# Patient Record
Sex: FEMALE | Race: BLACK OR AFRICAN AMERICAN | Employment: OTHER | URBAN - METROPOLITAN AREA
[De-identification: names, ages, dates, MRNs, and addresses within clinical notes are randomized per-mention and may not be internally consistent; named-entity substitution may affect disease eponyms.]

---

## 2017-02-03 ENCOUNTER — HOSPITAL ENCOUNTER (OUTPATIENT)
Dept: GENERAL RADIOLOGY | Age: 67
Discharge: HOME | End: 2017-02-03
Attending: FAMILY MEDICINE | Admitting: FAMILY MEDICINE

## 2017-02-03 LAB
BACTERIA: NEGATIVE /HPF
BILIRUBIN URINE: NEGATIVE MG/DL
BLOOD, URINE: NEGATIVE
CAST TYPE: ABNORMAL /HPF
CLARITY: CLEAR
COLOR: YELLOW
CRYSTAL TYPE: ABNORMAL /HPF
EPITHELIAL CELLS, UA: 10 /HPF
GLUCOSE, URINE: 500 MG/DL
KETONES, URINE: NEGATIVE MG/DL
LEUKOCYTE ESTERASE, URINE: NEGATIVE
MUCUS: ABNORMAL HPF
NITRITE URINE, QUANTITATIVE: NEGATIVE
PH, URINE: 6 (ref 5–8)
PROTEIN UA: NEGATIVE MG/DL
RBC URINE: 1 /HPF (ref 0–6)
SPECIFIC GRAVITY UA: 1.01 (ref 1–1.03)
UROBILINOGEN, URINE: <2 EU/DL (ref 0.2–1)
VOLUME, (UVOL): 12 ML (ref 10–12)
WBC UA: 2 /HPF (ref 0–5)

## 2017-06-12 ENCOUNTER — HOSPITAL ENCOUNTER (OUTPATIENT)
Dept: GENERAL RADIOLOGY | Age: 67
Discharge: HOME | End: 2017-06-12
Attending: FAMILY MEDICINE | Admitting: FAMILY MEDICINE

## 2017-06-12 LAB
ALBUMIN SERPL-MCNC: 4.4 GM/DL (ref 3.4–5)
ALP BLD-CCNC: 111 IU/L (ref 40–128)
ALT SERPL-CCNC: 18 U/L (ref 10–40)
ANION GAP SERPL CALCULATED.3IONS-SCNC: 12 MMOL/L (ref 4–16)
AST SERPL-CCNC: 14 IU/L (ref 15–37)
BILIRUB SERPL-MCNC: 0.7 MG/DL (ref 0–1)
BUN BLDV-MCNC: 22 MG/DL (ref 6–23)
CALCIUM SERPL-MCNC: 10.3 MG/DL (ref 8.3–10.6)
CHLORIDE BLD-SCNC: 98 MMOL/L (ref 99–110)
CHOLESTEROL: 266 MG/DL
CO2: 27 MMOL/L (ref 21–32)
CREAT SERPL-MCNC: 0.9 MG/DL (ref 0.6–1.1)
CREATININE URINE: 167.2 MG/DL (ref 28–217)
ERYTHROCYTE SEDIMENTATION RATE: 26 MM/HR (ref 0–30)
ESTIMATED AVERAGE GLUCOSE: 303 MG/DL
GFR AFRICAN AMERICAN: >60 ML/MIN/1.73M2
GFR NON-AFRICAN AMERICAN: >60 ML/MIN/1.73M2
GLUCOSE BLD-MCNC: 273 MG/DL (ref 70–140)
HBA1C MFR BLD: 12.2 % (ref 4.2–6.3)
HCT VFR BLD CALC: 35 % (ref 37–47)
HDLC SERPL-MCNC: 62 MG/DL
HEMOGLOBIN: 12.4 GM/DL (ref 12.5–16)
LDL CHOLESTEROL CALCULATED: 173 MG/DL
MCH RBC QN AUTO: 29 PG (ref 27–31)
MCHC RBC AUTO-ENTMCNC: 35.4 % (ref 32–36)
MCV RBC AUTO: 81.8 FL (ref 78–100)
MICROALBUMIN/CREAT 24H UR: 5.3 MG/DL
MICROALBUMIN/CREAT UR-RTO: 31.7 MG/G CREAT (ref 0–30)
PDW BLD-RTO: 13.2 % (ref 11.7–14.9)
PLATELET # BLD: 237 K/CU MM (ref 140–440)
PMV BLD AUTO: 10.4 FL (ref 7.5–11.1)
POTASSIUM SERPL-SCNC: 4.8 MMOL/L (ref 3.5–5.1)
RBC # BLD: 4.28 M/CU MM (ref 4.2–5.4)
SODIUM BLD-SCNC: 137 MMOL/L (ref 135–145)
T4 FREE: 1.13 NG/DL (ref 0.9–1.8)
TOTAL PROTEIN: 7.2 GM/DL (ref 6.4–8.2)
TRIGL SERPL-MCNC: 156 MG/DL
TSH HIGH SENSITIVITY: 1.72 UIU/ML (ref 0.27–4.2)
WBC # BLD: 4.2 K/CU MM (ref 4–10.5)

## 2018-02-07 ENCOUNTER — HOSPITAL ENCOUNTER (OUTPATIENT)
Dept: WOMENS IMAGING | Age: 68
Discharge: HOME | End: 2018-02-07
Attending: FAMILY MEDICINE | Admitting: FAMILY MEDICINE

## 2018-02-07 DIAGNOSIS — Z12.31 SCREENING MAMMOGRAM, ENCOUNTER FOR: ICD-10-CM

## 2018-02-13 ENCOUNTER — HOSPITAL ENCOUNTER (OUTPATIENT)
Dept: GENERAL RADIOLOGY | Age: 68
Discharge: HOME | End: 2018-02-13
Attending: FAMILY MEDICINE | Admitting: FAMILY MEDICINE

## 2018-02-13 DIAGNOSIS — N95.9 MENOPAUSAL AND PERIMENOPAUSAL DISORDER: ICD-10-CM

## 2018-02-13 LAB
ALBUMIN SERPL-MCNC: 4.5 GM/DL (ref 3.4–5)
ALP BLD-CCNC: 121 IU/L (ref 40–128)
ALT SERPL-CCNC: 14 U/L (ref 10–40)
ANION GAP SERPL CALCULATED.3IONS-SCNC: 11 MMOL/L (ref 4–16)
AST SERPL-CCNC: 15 IU/L (ref 15–37)
BASOPHILS ABSOLUTE: 0 K/CU MM
BASOPHILS RELATIVE PERCENT: 0.4 % (ref 0–1)
BILIRUB SERPL-MCNC: 0.5 MG/DL (ref 0–1)
BUN BLDV-MCNC: 22 MG/DL (ref 6–23)
CALCIUM SERPL-MCNC: 10.3 MG/DL (ref 8.3–10.6)
CHLORIDE BLD-SCNC: 98 MMOL/L (ref 99–110)
CHOLESTEROL: 313 MG/DL
CO2: 28 MMOL/L (ref 21–32)
CREAT SERPL-MCNC: 0.9 MG/DL (ref 0.6–1.1)
CREATININE URINE: 131.6 MG/DL (ref 28–217)
DIFFERENTIAL TYPE: ABNORMAL
EOSINOPHILS ABSOLUTE: 0 K/CU MM
EOSINOPHILS RELATIVE PERCENT: 0.8 % (ref 0–3)
ERYTHROCYTE SEDIMENTATION RATE: 29 MM/HR (ref 0–30)
ESTIMATED AVERAGE GLUCOSE: 344 MG/DL
GFR AFRICAN AMERICAN: >60 ML/MIN/1.73M2
GFR NON-AFRICAN AMERICAN: >60 ML/MIN/1.73M2
GLUCOSE FASTING: 299 MG/DL (ref 70–99)
HBA1C MFR BLD: 13.6 % (ref 4.2–6.3)
HCT VFR BLD CALC: 36.1 % (ref 37–47)
HDLC SERPL-MCNC: 66 MG/DL
HEMOGLOBIN: 12.5 GM/DL (ref 12.5–16)
IMMATURE NEUTROPHIL %: 0 % (ref 0–0.43)
LDL CHOLESTEROL DIRECT: 210 MG/DL
LYMPHOCYTES ABSOLUTE: 2.9 K/CU MM
LYMPHOCYTES RELATIVE PERCENT: 60.2 % (ref 24–44)
MAGNESIUM: 2.1 MG/DL (ref 1.8–2.4)
MCH RBC QN AUTO: 28.2 PG (ref 27–31)
MCHC RBC AUTO-ENTMCNC: 34.6 % (ref 32–36)
MCV RBC AUTO: 81.3 FL (ref 78–100)
MICROALBUMIN/CREAT 24H UR: 14.3 MG/DL
MICROALBUMIN/CREAT UR-RTO: 108.7 MG/G CREAT (ref 0–30)
MONOCYTES ABSOLUTE: 0.2 K/CU MM
MONOCYTES RELATIVE PERCENT: 4.7 % (ref 0–4)
NUCLEATED RBC %: 0 %
PDW BLD-RTO: 13.1 % (ref 11.7–14.9)
PLATELET # BLD: 241 K/CU MM (ref 140–440)
PMV BLD AUTO: 10.4 FL (ref 7.5–11.1)
POTASSIUM SERPL-SCNC: 4.7 MMOL/L (ref 3.5–5.1)
RBC # BLD: 4.44 M/CU MM (ref 4.2–5.4)
SEGMENTED NEUTROPHILS ABSOLUTE COUNT: 1.6 K/CU MM
SEGMENTED NEUTROPHILS RELATIVE PERCENT: 33.9 % (ref 36–66)
SODIUM BLD-SCNC: 137 MMOL/L (ref 135–145)
T4 FREE: 1.12 NG/DL (ref 0.9–1.8)
TOTAL IMMATURE NEUTOROPHIL: 0 K/CU MM
TOTAL NUCLEATED RBC: 0 K/CU MM
TOTAL PROTEIN: 7.6 GM/DL (ref 6.4–8.2)
TRIGL SERPL-MCNC: 203 MG/DL
TSH HIGH SENSITIVITY: 1.01 UIU/ML (ref 0.27–4.2)
URIC ACID: 4.5 MG/DL (ref 2.6–6)
VITAMIN D 25-HYDROXY: 16.41 NG/ML
WBC # BLD: 4.9 K/CU MM (ref 4–10.5)

## 2018-02-14 ENCOUNTER — HOSPITAL ENCOUNTER (OUTPATIENT)
Dept: OTHER | Age: 68
Discharge: HOME | End: 2018-02-14
Attending: OBSTETRICS & GYNECOLOGY | Admitting: OBSTETRICS & GYNECOLOGY

## 2018-02-20 LAB
HPV SOURCE: NORMAL
HPV, HIGH RISK OTHER: NEGATIVE

## 2018-05-26 PROBLEM — S72.001A CLOSED RIGHT HIP FRACTURE, INITIAL ENCOUNTER (HCC): Status: ACTIVE | Noted: 2018-05-26

## 2018-05-31 PROBLEM — D62 ACUTE BLOOD LOSS ANEMIA: Status: ACTIVE | Noted: 2018-05-31

## 2018-05-31 PROBLEM — I10 ESSENTIAL HYPERTENSION: Status: ACTIVE | Noted: 2018-05-31

## 2018-05-31 PROBLEM — R26.9 GAIT DISTURBANCE: Status: ACTIVE | Noted: 2018-05-31

## 2018-05-31 PROBLEM — R52 UNCONTROLLED PAIN: Status: ACTIVE | Noted: 2018-05-31

## 2018-07-20 ENCOUNTER — HOSPITAL ENCOUNTER (OUTPATIENT)
Dept: PHYSICAL THERAPY | Age: 68
Discharge: HOME | End: 2018-07-31
Attending: ORTHOPAEDIC SURGERY | Admitting: ORTHOPAEDIC SURGERY

## 2018-07-20 ASSESSMENT — PAIN SCALES - GENERAL: PAINLEVEL_OUTOF10: 0

## 2018-07-20 ASSESSMENT — PAIN DESCRIPTION - PAIN TYPE: TYPE: CHRONIC PAIN

## 2018-07-20 ASSESSMENT — PAIN DESCRIPTION - DESCRIPTORS: DESCRIPTORS: ACHING;DULL

## 2018-07-20 ASSESSMENT — PAIN DESCRIPTION - PROGRESSION: CLINICAL_PROGRESSION: GRADUALLY IMPROVING

## 2018-07-20 ASSESSMENT — PAIN DESCRIPTION - DIRECTION: RADIATING_TOWARDS: LATERAL THIGH

## 2018-07-20 ASSESSMENT — PAIN DESCRIPTION - FREQUENCY: FREQUENCY: INTERMITTENT

## 2018-07-20 ASSESSMENT — PAIN DESCRIPTION - ORIENTATION: ORIENTATION: RIGHT

## 2018-07-20 ASSESSMENT — PAIN DESCRIPTION - ONSET: ONSET: PROGRESSIVE

## 2018-07-20 ASSESSMENT — PAIN DESCRIPTION - LOCATION: LOCATION: KNEE;LEG

## 2018-07-20 NOTE — PLAN OF CARE
Tot Dep.) [] CH (0% Impaired, Indep.)  [x] CI (1-19% Impaired, SBA-CGA)  [] CJ (20-39% Impaired, MIN A)  [] CK  (40-59% Impairment, Mod A)  [] CL  (60-79% Impairment, Max A)  [] CM  (80-99% Impairment, Dep.)   [] CN  (100% Impairment, Tot Dep.)  [] CH (0% Impaired, Indep.)  [] CI (1-19% Impaired, SBA-CGA)  [] CJ (20-39% Impaired, MIN A)  [] CK  (40-59% Impairment, Mod A)  [] CL  (60-79% Impairment, Max A)  [] CM  (80-99% Impairment, Dep.)   [] CN  (100% Impairment, Tot Dep.)          Electronically signed by:  Ajit Ochoa PT, MPT, ATC  7/20/2018, 4:10 PM    7/20/2018, 4:11 PM  If you have any questions or concerns, please don't hesitate to call.   Thank you for your referral.      Physician Signature:________________________________Date:_________ TIME: _____  By signing above, therapists plan is approved by physician

## 2018-07-20 NOTE — FLOWSHEET NOTE
LAQ RTB 10x TB Fig 8 on ankles                                                              Other Therapeutic Activities/Education: Patient received education on their current pathology and how their condition effects them with their functional activities. Patient understood discussion and questions were answered. Patient understands their activity limitations and understands rational for treatment progression. Home Exercise Program:  Issued, practiced and pt demo ability to perform     Modality/intervention used:    [x] Therapeutic Exercise  [x] Modalities:  [x] Therapeutic Activity     [] Ultrasound  [] Elec  Stim  [x] Gait Training      [] Cervical Traction [] Lumbar Traction  [x] Neuromuscular Re-education    [x] Cold/hotpack [] Iontophoresis   [x] Instruction in HEP      [] Vasopneumatic     [x] Manual Therapy               [x] Aquatic Therapy     Manual Treatments: none     Modalities:  none    Communication with other providers:  POC faxed 7/20/18    Education provided to patient/caregiver:  Pool information given and verbal instructions as well    Adverse reactions to treatment:      Equipment provided:      Assessment: Pt is a 80 yo female who presents almost 2 months postop R hip Fx and ORIF. She continues w/ limited hip and LE strength and has developed knee pain now as well. She demonstrates altered gait and decreased balance w/ difficulty getting up from lower surfaces as well. She will benefit from PT to help restore her prior functional level. Prior to her injury in May she had no pain or limit in R hip or knee. Patient agrees with established plan of care and assisted in the development of their short term and long term goals. Patient had no adverse reaction with initial treatment and there are no barriers to learning.  Demonstrates no mental or cognitive disorder.      Time In / Time Out:    1430/1530               Timed Code/Total Treatment Minutes:  25/60    Patients Report of Tolerance: [] Patient limited by fatigue        [x] Patient limited by pain   [] Patient limited by other medical complications   [] Other:     Prognosis:   [x] Good [] Fair  [] Poor    Plan:   [] Continue per plan of care [] Alter current plan (see comments)  [x] Plan of care initiated [] Hold pending MD visit [] Discharge    Plan for Next Session:   begin pool Rx 2x/week for 3 weeks, then re-check and determine if stay at pool or add any more land based Rx       Next Progress Note due:  By Visit 10          Electronically signed by:  Nathan Loja, PT, MPT, ATC  7/20/2018, 4:11 PM    7/20/2018, 4:15 PM

## 2018-08-01 ENCOUNTER — HOSPITAL ENCOUNTER (OUTPATIENT)
Dept: OTHER | Age: 68
Discharge: HOME | End: 2018-08-31
Attending: ORTHOPAEDIC SURGERY | Admitting: ORTHOPAEDIC SURGERY

## 2018-11-02 ENCOUNTER — HOSPITAL ENCOUNTER (OUTPATIENT)
Age: 68
Discharge: HOME | End: 2018-11-02
Payer: MEDICARE

## 2018-11-02 LAB
ALBUMIN SERPL-MCNC: 4.4 GM/DL (ref 3.4–5)
ALP BLD-CCNC: 148 IU/L (ref 40–128)
ALT SERPL-CCNC: 12 U/L (ref 10–40)
ANION GAP SERPL CALCULATED.3IONS-SCNC: 14 MMOL/L (ref 4–16)
AST SERPL-CCNC: 12 IU/L (ref 15–37)
BILIRUB SERPL-MCNC: 0.6 MG/DL (ref 0–1)
BUN BLDV-MCNC: 18 MG/DL (ref 6–23)
CALCIUM SERPL-MCNC: 10.1 MG/DL (ref 8.3–10.6)
CHLORIDE BLD-SCNC: 99 MMOL/L (ref 99–110)
CHOLESTEROL: 276 MG/DL
CO2: 24 MMOL/L (ref 21–32)
CREAT SERPL-MCNC: 0.8 MG/DL (ref 0.6–1.1)
CREATININE URINE: 191.2 MG/DL (ref 28–217)
ESTIMATED AVERAGE GLUCOSE: 240 MG/DL
GFR AFRICAN AMERICAN: >60 ML/MIN/1.73M2
GFR NON-AFRICAN AMERICAN: >60 ML/MIN/1.73M2
GLUCOSE BLD-MCNC: 301 MG/DL (ref 70–99)
HBA1C MFR BLD: 10 % (ref 4.2–6.3)
HCT VFR BLD CALC: 33.8 % (ref 37–47)
HDLC SERPL-MCNC: 57 MG/DL
HEMOGLOBIN: 11.5 GM/DL (ref 12.5–16)
LDL CHOLESTEROL DIRECT: 203 MG/DL
MCH RBC QN AUTO: 29.2 PG (ref 27–31)
MCHC RBC AUTO-ENTMCNC: 34 % (ref 32–36)
MCV RBC AUTO: 85.8 FL (ref 78–100)
MICROALBUMIN/CREAT 24H UR: 25.9 MG/DL
MICROALBUMIN/CREAT UR-RTO: 135.5 MG/G CREAT (ref 0–30)
PDW BLD-RTO: 14.1 % (ref 11.7–14.9)
PLATELET # BLD: 240 K/CU MM (ref 140–440)
PMV BLD AUTO: 10.6 FL (ref 7.5–11.1)
POTASSIUM SERPL-SCNC: 4.8 MMOL/L (ref 3.5–5.1)
RBC # BLD: 3.94 M/CU MM (ref 4.2–5.4)
SODIUM BLD-SCNC: 137 MMOL/L (ref 135–145)
T4 FREE: 1.11 NG/DL (ref 0.9–1.8)
TOTAL PROTEIN: 7 GM/DL (ref 6.4–8.2)
TRIGL SERPL-MCNC: 185 MG/DL
TSH HIGH SENSITIVITY: 1.38 UIU/ML (ref 0.27–4.2)
WBC # BLD: 5.5 K/CU MM (ref 4–10.5)

## 2018-11-02 PROCEDURE — 82570 ASSAY OF URINE CREATININE: CPT

## 2018-11-02 PROCEDURE — 82043 UR ALBUMIN QUANTITATIVE: CPT

## 2018-11-02 PROCEDURE — 80053 COMPREHEN METABOLIC PANEL: CPT

## 2018-11-02 PROCEDURE — 36415 COLL VENOUS BLD VENIPUNCTURE: CPT

## 2018-11-02 PROCEDURE — 83036 HEMOGLOBIN GLYCOSYLATED A1C: CPT

## 2018-11-02 PROCEDURE — 85027 COMPLETE CBC AUTOMATED: CPT

## 2018-11-02 PROCEDURE — 80061 LIPID PANEL: CPT

## 2018-11-02 PROCEDURE — 84443 ASSAY THYROID STIM HORMONE: CPT

## 2018-11-02 PROCEDURE — 83721 ASSAY OF BLOOD LIPOPROTEIN: CPT

## 2018-11-02 PROCEDURE — 84439 ASSAY OF FREE THYROXINE: CPT

## 2018-12-18 ENCOUNTER — HOSPITAL ENCOUNTER (OUTPATIENT)
Age: 68
Setting detail: SPECIMEN
Discharge: HOME | End: 2018-12-18
Payer: MEDICARE

## 2018-12-18 LAB
BACTERIA: ABNORMAL /HPF
BILIRUBIN URINE: NEGATIVE MG/DL
BLOOD, URINE: ABNORMAL
CLARITY: ABNORMAL
COLOR: ABNORMAL
GLUCOSE, URINE: NEGATIVE MG/DL
KETONES, URINE: NEGATIVE MG/DL
LEUKOCYTE ESTERASE, URINE: ABNORMAL
MUCUS: ABNORMAL HPF
NITRITE URINE, QUANTITATIVE: NEGATIVE
PH, URINE: 5 (ref 5–8)
PROTEIN UA: 100 MG/DL
RBC URINE: 197 /HPF (ref 0–6)
SPECIFIC GRAVITY UA: 1.02 (ref 1–1.03)
SQUAMOUS EPITHELIAL: 27 /HPF
TRICHOMONAS: ABNORMAL /HPF
UROBILINOGEN, URINE: 1 MG/DL (ref 0.2–1)
WBC CLUMP: ABNORMAL /HPF
WBC UA: 298 /HPF (ref 0–5)

## 2018-12-18 PROCEDURE — 87186 SC STD MICRODIL/AGAR DIL: CPT

## 2018-12-18 PROCEDURE — 87077 CULTURE AEROBIC IDENTIFY: CPT

## 2018-12-18 PROCEDURE — 81001 URINALYSIS AUTO W/SCOPE: CPT

## 2018-12-18 PROCEDURE — 87086 URINE CULTURE/COLONY COUNT: CPT

## 2018-12-20 LAB
CULTURE: NORMAL
Lab: NORMAL
ORGANISM: NORMAL
REPORT STATUS: NORMAL
SPECIMEN: NORMAL
TOTAL COLONY COUNT: NORMAL

## 2019-04-01 ENCOUNTER — HOSPITAL ENCOUNTER (OUTPATIENT)
Dept: WOMENS IMAGING | Age: 69
Discharge: HOME | End: 2019-04-01
Payer: MEDICARE

## 2019-04-01 ENCOUNTER — HOSPITAL ENCOUNTER (OUTPATIENT)
Age: 69
Discharge: HOME | End: 2019-04-01
Payer: MEDICARE

## 2019-04-01 DIAGNOSIS — Z12.31 VISIT FOR SCREENING MAMMOGRAM: ICD-10-CM

## 2019-04-01 LAB
ALBUMIN SERPL-MCNC: 4.5 GM/DL (ref 3.4–5)
ALP BLD-CCNC: 113 IU/L (ref 40–128)
ALT SERPL-CCNC: 12 U/L (ref 10–40)
ANION GAP SERPL CALCULATED.3IONS-SCNC: 11 MMOL/L (ref 4–16)
AST SERPL-CCNC: 13 IU/L (ref 15–37)
BASOPHILS ABSOLUTE: 0 K/CU MM
BASOPHILS RELATIVE PERCENT: 0.4 % (ref 0–1)
BILIRUB SERPL-MCNC: 0.5 MG/DL (ref 0–1)
BUN BLDV-MCNC: 23 MG/DL (ref 6–23)
CALCIUM SERPL-MCNC: 10.5 MG/DL (ref 8.3–10.6)
CHLORIDE BLD-SCNC: 105 MMOL/L (ref 99–110)
CHOLESTEROL: 206 MG/DL
CO2: 22 MMOL/L (ref 21–32)
CREAT SERPL-MCNC: 0.9 MG/DL (ref 0.6–1.1)
CREATININE URINE: 189.5 MG/DL (ref 28–217)
DIFFERENTIAL TYPE: ABNORMAL
EOSINOPHILS ABSOLUTE: 0 K/CU MM
EOSINOPHILS RELATIVE PERCENT: 0.6 % (ref 0–3)
ERYTHROCYTE SEDIMENTATION RATE: 31 MM/HR (ref 0–30)
ESTIMATED AVERAGE GLUCOSE: 243 MG/DL
GFR AFRICAN AMERICAN: >60 ML/MIN/1.73M2
GFR NON-AFRICAN AMERICAN: >60 ML/MIN/1.73M2
GLUCOSE BLD-MCNC: 181 MG/DL (ref 70–99)
HBA1C MFR BLD: 10.1 % (ref 4.2–6.3)
HCT VFR BLD CALC: 34.8 % (ref 37–47)
HDLC SERPL-MCNC: 58 MG/DL
HEMOGLOBIN: 11.7 GM/DL (ref 12.5–16)
IMMATURE NEUTROPHIL %: 0 % (ref 0–0.43)
LDL CHOLESTEROL DIRECT: 134 MG/DL
LYMPHOCYTES ABSOLUTE: 3 K/CU MM
LYMPHOCYTES RELATIVE PERCENT: 63.4 % (ref 24–44)
MCH RBC QN AUTO: 28.9 PG (ref 27–31)
MCHC RBC AUTO-ENTMCNC: 33.6 % (ref 32–36)
MCV RBC AUTO: 85.9 FL (ref 78–100)
MICROALBUMIN/CREAT 24H UR: 23.4 MG/DL
MICROALBUMIN/CREAT UR-RTO: 123.5 MG/G CREAT (ref 0–30)
MONOCYTES ABSOLUTE: 0.3 K/CU MM
MONOCYTES RELATIVE PERCENT: 6.8 % (ref 0–4)
NUCLEATED RBC %: 0 %
PDW BLD-RTO: 14.1 % (ref 11.7–14.9)
PLATELET # BLD: 257 K/CU MM (ref 140–440)
PMV BLD AUTO: 10.6 FL (ref 7.5–11.1)
POTASSIUM SERPL-SCNC: 5.6 MMOL/L (ref 3.5–5.1)
RBC # BLD: 4.05 M/CU MM (ref 4.2–5.4)
SEGMENTED NEUTROPHILS ABSOLUTE COUNT: 1.4 K/CU MM
SEGMENTED NEUTROPHILS RELATIVE PERCENT: 28.8 % (ref 36–66)
SODIUM BLD-SCNC: 138 MMOL/L (ref 135–145)
T4 FREE: 0.97 NG/DL (ref 0.9–1.8)
TOTAL IMMATURE NEUTOROPHIL: 0 K/CU MM
TOTAL NUCLEATED RBC: 0 K/CU MM
TOTAL PROTEIN: 7.1 GM/DL (ref 6.4–8.2)
TRIGL SERPL-MCNC: 151 MG/DL
TSH HIGH SENSITIVITY: 2.27 UIU/ML (ref 0.27–4.2)
URIC ACID: 5.5 MG/DL (ref 2.6–6)
WBC # BLD: 4.7 K/CU MM (ref 4–10.5)

## 2019-04-01 PROCEDURE — 85025 COMPLETE CBC W/AUTO DIFF WBC: CPT

## 2019-04-01 PROCEDURE — 83036 HEMOGLOBIN GLYCOSYLATED A1C: CPT

## 2019-04-01 PROCEDURE — 83721 ASSAY OF BLOOD LIPOPROTEIN: CPT

## 2019-04-01 PROCEDURE — 84439 ASSAY OF FREE THYROXINE: CPT

## 2019-04-01 PROCEDURE — 84443 ASSAY THYROID STIM HORMONE: CPT

## 2019-04-01 PROCEDURE — 36415 COLL VENOUS BLD VENIPUNCTURE: CPT

## 2019-04-01 PROCEDURE — 85652 RBC SED RATE AUTOMATED: CPT

## 2019-04-01 PROCEDURE — 82570 ASSAY OF URINE CREATININE: CPT

## 2019-04-01 PROCEDURE — 80053 COMPREHEN METABOLIC PANEL: CPT

## 2019-04-01 PROCEDURE — 82043 UR ALBUMIN QUANTITATIVE: CPT

## 2019-04-01 PROCEDURE — 84550 ASSAY OF BLOOD/URIC ACID: CPT

## 2019-04-01 PROCEDURE — 80061 LIPID PANEL: CPT

## 2019-04-01 PROCEDURE — 77067 SCR MAMMO BI INCL CAD: CPT

## 2019-04-03 ENCOUNTER — HOSPITAL ENCOUNTER (OUTPATIENT)
Dept: WOMENS IMAGING | Age: 69
Discharge: HOME | End: 2019-04-03
Payer: MEDICARE

## 2019-04-03 ENCOUNTER — HOSPITAL ENCOUNTER (OUTPATIENT)
Dept: ULTRASOUND IMAGING | Age: 69
End: 2019-04-03
Payer: MEDICARE

## 2019-04-03 DIAGNOSIS — N64.89 BREAST ASYMMETRY ON EXAMINATION: ICD-10-CM

## 2019-04-03 PROCEDURE — 77065 DX MAMMO INCL CAD UNI: CPT

## 2019-05-13 ENCOUNTER — HOSPITAL ENCOUNTER (OUTPATIENT)
Age: 69
Discharge: HOME | End: 2019-05-13
Payer: MEDICARE

## 2019-05-13 LAB
FERRITIN: 313 NG/ML (ref 15–150)
HCT VFR BLD CALC: 34.7 % (ref 37–47)
HEMOGLOBIN: 11.7 GM/DL (ref 12.5–16)
IRON: 113 UG/DL (ref 37–145)
PCT TRANSFERRIN: 43 % (ref 10–44)
POTASSIUM SERPL-SCNC: 5 MMOL/L (ref 3.5–5.1)
TOTAL IRON BINDING CAPACITY: 262 UG/DL (ref 250–450)
UNSATURATED IRON BINDING CAPACITY: 149 UG/DL (ref 110–370)

## 2019-05-13 PROCEDURE — 83550 IRON BINDING TEST: CPT

## 2019-05-13 PROCEDURE — 83540 ASSAY OF IRON: CPT

## 2019-05-13 PROCEDURE — 85014 HEMATOCRIT: CPT

## 2019-05-13 PROCEDURE — 84132 ASSAY OF SERUM POTASSIUM: CPT

## 2019-05-13 PROCEDURE — 85018 HEMOGLOBIN: CPT

## 2019-05-13 PROCEDURE — 36415 COLL VENOUS BLD VENIPUNCTURE: CPT

## 2019-05-13 PROCEDURE — 82728 ASSAY OF FERRITIN: CPT

## 2019-10-31 ENCOUNTER — HOSPITAL ENCOUNTER (OUTPATIENT)
Dept: WOMENS IMAGING | Age: 69
Discharge: HOME | End: 2019-10-31
Payer: MEDICARE

## 2019-10-31 ENCOUNTER — HOSPITAL ENCOUNTER (OUTPATIENT)
Age: 69
Discharge: HOME | End: 2019-10-31
Payer: MEDICARE

## 2019-10-31 DIAGNOSIS — Z12.31 SCREENING MAMMOGRAM, ENCOUNTER FOR: ICD-10-CM

## 2019-10-31 LAB
ALBUMIN SERPL-MCNC: 4.1 GM/DL (ref 3.4–5)
ALP BLD-CCNC: 108 IU/L (ref 40–129)
ALT SERPL-CCNC: 16 U/L (ref 10–40)
ANION GAP SERPL CALCULATED.3IONS-SCNC: 11 MMOL/L (ref 4–16)
AST SERPL-CCNC: 13 IU/L (ref 15–37)
BACTERIA: ABNORMAL /HPF
BASOPHILS ABSOLUTE: 0 K/CU MM
BASOPHILS RELATIVE PERCENT: 0.4 % (ref 0–1)
BILIRUB SERPL-MCNC: 0.5 MG/DL (ref 0–1)
BILIRUBIN DIRECT: 0.2 MG/DL (ref 0–0.3)
BILIRUBIN URINE: NEGATIVE MG/DL
BILIRUBIN, INDIRECT: 0.3 MG/DL (ref 0–0.7)
BLOOD, URINE: ABNORMAL
BUN BLDV-MCNC: 21 MG/DL (ref 6–23)
CALCIUM SERPL-MCNC: 10.1 MG/DL (ref 8.3–10.6)
CHLORIDE BLD-SCNC: 103 MMOL/L (ref 99–110)
CHOLESTEROL: 290 MG/DL
CLARITY: ABNORMAL
CO2: 24 MMOL/L (ref 21–32)
COLOR: YELLOW
CREAT SERPL-MCNC: 0.9 MG/DL (ref 0.6–1.1)
DIFFERENTIAL TYPE: ABNORMAL
EOSINOPHILS ABSOLUTE: 0.1 K/CU MM
EOSINOPHILS RELATIVE PERCENT: 1.3 % (ref 0–3)
ERYTHROCYTE SEDIMENTATION RATE: 34 MM/HR (ref 0–30)
ESTIMATED AVERAGE GLUCOSE: 324 MG/DL
GFR AFRICAN AMERICAN: >60 ML/MIN/1.73M2
GFR NON-AFRICAN AMERICAN: >60 ML/MIN/1.73M2
GLUCOSE BLD-MCNC: 303 MG/DL (ref 70–99)
GLUCOSE, URINE: >500 MG/DL
HBA1C MFR BLD: 12.9 % (ref 4.2–6.3)
HCT VFR BLD CALC: 32.2 % (ref 37–47)
HDLC SERPL-MCNC: 53 MG/DL
HEMOGLOBIN: 11 GM/DL (ref 12.5–16)
IMMATURE NEUTROPHIL %: 0.2 % (ref 0–0.43)
KETONES, URINE: NEGATIVE MG/DL
LDL CHOLESTEROL DIRECT: 194 MG/DL
LEUKOCYTE ESTERASE, URINE: ABNORMAL
LYMPHOCYTES ABSOLUTE: 3 K/CU MM
LYMPHOCYTES RELATIVE PERCENT: 56.3 % (ref 24–44)
MAGNESIUM: 2 MG/DL (ref 1.8–2.4)
MCH RBC QN AUTO: 29.2 PG (ref 27–31)
MCHC RBC AUTO-ENTMCNC: 34.2 % (ref 32–36)
MCV RBC AUTO: 85.4 FL (ref 78–100)
MONOCYTES ABSOLUTE: 0.3 K/CU MM
MONOCYTES RELATIVE PERCENT: 6.1 % (ref 0–4)
MUCUS: ABNORMAL HPF
NITRITE URINE, QUANTITATIVE: NEGATIVE
NUCLEATED RBC %: 0 %
PDW BLD-RTO: 13.3 % (ref 11.7–14.9)
PH, URINE: 5 (ref 5–8)
PLATELET # BLD: 208 K/CU MM (ref 140–440)
PMV BLD AUTO: 10.8 FL (ref 7.5–11.1)
POTASSIUM SERPL-SCNC: 5.1 MMOL/L (ref 3.5–5.1)
PROTEIN UA: 100 MG/DL
RBC # BLD: 3.77 M/CU MM (ref 4.2–5.4)
RBC URINE: 62 /HPF (ref 0–6)
SEGMENTED NEUTROPHILS ABSOLUTE COUNT: 1.9 K/CU MM
SEGMENTED NEUTROPHILS RELATIVE PERCENT: 35.7 % (ref 36–66)
SODIUM BLD-SCNC: 138 MMOL/L (ref 135–145)
SPECIFIC GRAVITY UA: 1.02 (ref 1–1.03)
SQUAMOUS EPITHELIAL: 8 /HPF
T4 FREE: 1.05 NG/DL (ref 0.9–1.8)
TOTAL IMMATURE NEUTOROPHIL: 0.01 K/CU MM
TOTAL NUCLEATED RBC: 0 K/CU MM
TOTAL PROTEIN: 6.6 GM/DL (ref 6.4–8.2)
TRICHOMONAS: ABNORMAL /HPF
TRIGL SERPL-MCNC: 248 MG/DL
TSH HIGH SENSITIVITY: 2.66 UIU/ML (ref 0.27–4.2)
URIC ACID: 5.1 MG/DL (ref 2.6–6)
UROBILINOGEN, URINE: 1 MG/DL (ref 0.2–1)
VITAMIN D 25-HYDROXY: 20.79 NG/ML
WBC # BLD: 5.3 K/CU MM (ref 4–10.5)
WBC UA: 17 /HPF (ref 0–5)

## 2019-10-31 PROCEDURE — 85025 COMPLETE CBC W/AUTO DIFF WBC: CPT

## 2019-10-31 PROCEDURE — 83735 ASSAY OF MAGNESIUM: CPT

## 2019-10-31 PROCEDURE — 85652 RBC SED RATE AUTOMATED: CPT

## 2019-10-31 PROCEDURE — 84550 ASSAY OF BLOOD/URIC ACID: CPT

## 2019-10-31 PROCEDURE — 83721 ASSAY OF BLOOD LIPOPROTEIN: CPT

## 2019-10-31 PROCEDURE — 84439 ASSAY OF FREE THYROXINE: CPT

## 2019-10-31 PROCEDURE — 80061 LIPID PANEL: CPT

## 2019-10-31 PROCEDURE — 82306 VITAMIN D 25 HYDROXY: CPT

## 2019-10-31 PROCEDURE — 81001 URINALYSIS AUTO W/SCOPE: CPT

## 2019-10-31 PROCEDURE — 77080 DXA BONE DENSITY AXIAL: CPT

## 2019-10-31 PROCEDURE — 84443 ASSAY THYROID STIM HORMONE: CPT

## 2019-10-31 PROCEDURE — 80053 COMPREHEN METABOLIC PANEL: CPT

## 2019-10-31 PROCEDURE — 82248 BILIRUBIN DIRECT: CPT

## 2019-10-31 PROCEDURE — 36415 COLL VENOUS BLD VENIPUNCTURE: CPT

## 2019-10-31 PROCEDURE — 83036 HEMOGLOBIN GLYCOSYLATED A1C: CPT

## 2020-01-01 ENCOUNTER — HOSPITAL ENCOUNTER (OUTPATIENT)
Dept: GENERAL RADIOLOGY | Age: 70
Discharge: HOME | End: 2020-09-21
Payer: MEDICARE

## 2020-01-01 ENCOUNTER — HOSPITAL ENCOUNTER (OUTPATIENT)
Dept: WOMENS IMAGING | Age: 70
Discharge: HOME | End: 2020-10-30
Payer: MEDICARE

## 2020-01-01 ENCOUNTER — HOSPITAL ENCOUNTER (OUTPATIENT)
Age: 70
Discharge: HOME | End: 2020-07-30
Payer: MEDICARE

## 2020-01-01 ENCOUNTER — HOSPITAL ENCOUNTER (OUTPATIENT)
Age: 70
Discharge: HOME | End: 2020-09-21
Payer: MEDICARE

## 2020-01-01 ENCOUNTER — OFFICE VISIT (OUTPATIENT)
Dept: ORTHOPEDIC SURGERY | Age: 70
End: 2020-01-01
Payer: MEDICARE

## 2020-01-01 VITALS
OXYGEN SATURATION: 98 % | BODY MASS INDEX: 24.92 KG/M2 | WEIGHT: 132 LBS | HEART RATE: 76 BPM | RESPIRATION RATE: 15 BRPM | HEIGHT: 61 IN

## 2020-01-01 LAB
ALBUMIN SERPL-MCNC: 4.4 GM/DL (ref 3.4–5)
ALP BLD-CCNC: 122 IU/L (ref 40–129)
ALT SERPL-CCNC: 15 U/L (ref 10–40)
ANION GAP SERPL CALCULATED.3IONS-SCNC: 11 MMOL/L (ref 4–16)
AST SERPL-CCNC: 14 IU/L (ref 15–37)
BACTERIA: NEGATIVE /HPF
BASOPHILS ABSOLUTE: 0 K/CU MM
BASOPHILS RELATIVE PERCENT: 0.8 % (ref 0–1)
BILIRUB SERPL-MCNC: 0.5 MG/DL (ref 0–1)
BILIRUBIN DIRECT: 0.2 MG/DL (ref 0–0.3)
BILIRUBIN URINE: NEGATIVE MG/DL
BILIRUBIN, INDIRECT: 0.3 MG/DL (ref 0–0.7)
BLOOD, URINE: ABNORMAL
BUN BLDV-MCNC: 23 MG/DL (ref 6–23)
CALCIUM SERPL-MCNC: 10.3 MG/DL (ref 8.3–10.6)
CHLORIDE BLD-SCNC: 97 MMOL/L (ref 99–110)
CHOLESTEROL: 339 MG/DL
CLARITY: CLEAR
CO2: 27 MMOL/L (ref 21–32)
COLOR: ABNORMAL
CREAT SERPL-MCNC: 1.3 MG/DL (ref 0.6–1.1)
DIFFERENTIAL TYPE: ABNORMAL
EOSINOPHILS ABSOLUTE: 0.1 K/CU MM
EOSINOPHILS RELATIVE PERCENT: 1.3 % (ref 0–3)
ERYTHROCYTE SEDIMENTATION RATE: 36 MM/HR (ref 0–30)
ESTIMATED AVERAGE GLUCOSE: 387 MG/DL
GFR AFRICAN AMERICAN: 49 ML/MIN/1.73M2
GFR NON-AFRICAN AMERICAN: 41 ML/MIN/1.73M2
GLUCOSE BLD-MCNC: 360 MG/DL (ref 70–99)
GLUCOSE, URINE: >500 MG/DL
HBA1C MFR BLD: 15.1 % (ref 4.2–6.3)
HCT VFR BLD CALC: 34.1 % (ref 37–47)
HDLC SERPL-MCNC: 49 MG/DL
HEMOGLOBIN: 11.6 GM/DL (ref 12.5–16)
IMMATURE NEUTROPHIL %: 0 % (ref 0–0.43)
KETONES, URINE: NEGATIVE MG/DL
LDL CHOLESTEROL DIRECT: 238 MG/DL
LEUKOCYTE ESTERASE, URINE: NEGATIVE
LYMPHOCYTES ABSOLUTE: 2.3 K/CU MM
LYMPHOCYTES RELATIVE PERCENT: 59.2 % (ref 24–44)
MAGNESIUM: 2.2 MG/DL (ref 1.8–2.4)
MCH RBC QN AUTO: 28.3 PG (ref 27–31)
MCHC RBC AUTO-ENTMCNC: 34 % (ref 32–36)
MCV RBC AUTO: 83.2 FL (ref 78–100)
MONOCYTES ABSOLUTE: 0.3 K/CU MM
MONOCYTES RELATIVE PERCENT: 7.8 % (ref 0–4)
NITRITE URINE, QUANTITATIVE: NEGATIVE
NUCLEATED RBC %: 0 %
PDW BLD-RTO: 13.1 % (ref 11.7–14.9)
PH, URINE: 5 (ref 5–8)
PLATELET # BLD: 221 K/CU MM (ref 140–440)
PMV BLD AUTO: 10.9 FL (ref 7.5–11.1)
POTASSIUM SERPL-SCNC: 5 MMOL/L (ref 3.5–5.1)
PROTEIN UA: NEGATIVE MG/DL
RBC # BLD: 4.1 M/CU MM (ref 4.2–5.4)
RBC URINE: <1 /HPF (ref 0–6)
SEGMENTED NEUTROPHILS ABSOLUTE COUNT: 1.2 K/CU MM
SEGMENTED NEUTROPHILS RELATIVE PERCENT: 30.9 % (ref 36–66)
SODIUM BLD-SCNC: 135 MMOL/L (ref 135–145)
SPECIFIC GRAVITY UA: 1.02 (ref 1–1.03)
SQUAMOUS EPITHELIAL: 1 /HPF
T4 FREE: 0.94 NG/DL (ref 0.9–1.8)
TOTAL IMMATURE NEUTOROPHIL: 0 K/CU MM
TOTAL NUCLEATED RBC: 0 K/CU MM
TOTAL PROTEIN: 7.3 GM/DL (ref 6.4–8.2)
TRICHOMONAS: ABNORMAL /HPF
TRIGL SERPL-MCNC: 271 MG/DL
TSH HIGH SENSITIVITY: 2.55 UIU/ML (ref 0.27–4.2)
URIC ACID: 6.3 MG/DL (ref 2.6–6)
UROBILINOGEN, URINE: NORMAL MG/DL (ref 0.2–1)
VITAMIN D 25-HYDROXY: 20.81 NG/ML
WBC # BLD: 4 K/CU MM (ref 4–10.5)
WBC UA: 1 /HPF (ref 0–5)

## 2020-01-01 PROCEDURE — 84550 ASSAY OF BLOOD/URIC ACID: CPT

## 2020-01-01 PROCEDURE — 80053 COMPREHEN METABOLIC PANEL: CPT

## 2020-01-01 PROCEDURE — 84439 ASSAY OF FREE THYROXINE: CPT

## 2020-01-01 PROCEDURE — 3017F COLORECTAL CA SCREEN DOC REV: CPT | Performed by: ORTHOPAEDIC SURGERY

## 2020-01-01 PROCEDURE — 84443 ASSAY THYROID STIM HORMONE: CPT

## 2020-01-01 PROCEDURE — 77067 SCR MAMMO BI INCL CAD: CPT

## 2020-01-01 PROCEDURE — 85652 RBC SED RATE AUTOMATED: CPT

## 2020-01-01 PROCEDURE — 82306 VITAMIN D 25 HYDROXY: CPT

## 2020-01-01 PROCEDURE — 73522 X-RAY EXAM HIPS BI 3-4 VIEWS: CPT

## 2020-01-01 PROCEDURE — 36415 COLL VENOUS BLD VENIPUNCTURE: CPT

## 2020-01-01 PROCEDURE — 1090F PRES/ABSN URINE INCON ASSESS: CPT | Performed by: ORTHOPAEDIC SURGERY

## 2020-01-01 PROCEDURE — G8484 FLU IMMUNIZE NO ADMIN: HCPCS | Performed by: ORTHOPAEDIC SURGERY

## 2020-01-01 PROCEDURE — 99203 OFFICE O/P NEW LOW 30 MIN: CPT | Performed by: ORTHOPAEDIC SURGERY

## 2020-01-01 PROCEDURE — 1123F ACP DISCUSS/DSCN MKR DOCD: CPT | Performed by: ORTHOPAEDIC SURGERY

## 2020-01-01 PROCEDURE — 83721 ASSAY OF BLOOD LIPOPROTEIN: CPT

## 2020-01-01 PROCEDURE — 83036 HEMOGLOBIN GLYCOSYLATED A1C: CPT

## 2020-01-01 PROCEDURE — 82248 BILIRUBIN DIRECT: CPT

## 2020-01-01 PROCEDURE — G8420 CALC BMI NORM PARAMETERS: HCPCS | Performed by: ORTHOPAEDIC SURGERY

## 2020-01-01 PROCEDURE — 85025 COMPLETE CBC W/AUTO DIFF WBC: CPT

## 2020-01-01 PROCEDURE — G8427 DOCREV CUR MEDS BY ELIG CLIN: HCPCS | Performed by: ORTHOPAEDIC SURGERY

## 2020-01-01 PROCEDURE — 4040F PNEUMOC VAC/ADMIN/RCVD: CPT | Performed by: ORTHOPAEDIC SURGERY

## 2020-01-01 PROCEDURE — 80061 LIPID PANEL: CPT

## 2020-01-01 PROCEDURE — 1036F TOBACCO NON-USER: CPT | Performed by: ORTHOPAEDIC SURGERY

## 2020-01-01 PROCEDURE — G8399 PT W/DXA RESULTS DOCUMENT: HCPCS | Performed by: ORTHOPAEDIC SURGERY

## 2020-01-01 PROCEDURE — 81001 URINALYSIS AUTO W/SCOPE: CPT

## 2020-01-01 PROCEDURE — 83735 ASSAY OF MAGNESIUM: CPT

## 2020-01-01 RX ORDER — PREDNISOLONE ACETATE 10 MG/ML
SUSPENSION/ DROPS OPHTHALMIC
COMMUNITY
Start: 2019-09-08

## 2020-01-01 RX ORDER — SERTRALINE HYDROCHLORIDE 25 MG/1
TABLET, FILM COATED ORAL
COMMUNITY
Start: 2020-01-01

## 2020-01-01 RX ORDER — GABAPENTIN 300 MG/1
CAPSULE ORAL
COMMUNITY
Start: 2020-01-01

## 2020-01-01 RX ORDER — LISINOPRIL 5 MG/1
TABLET ORAL
COMMUNITY
Start: 2019-09-05

## 2020-01-01 RX ORDER — OFLOXACIN 3 MG/ML
SOLUTION/ DROPS OPHTHALMIC
COMMUNITY
Start: 2019-09-08

## 2020-01-01 ASSESSMENT — ENCOUNTER SYMPTOMS
SHORTNESS OF BREATH: 0
EYE PAIN: 0
EYE REDNESS: 0
CHEST TIGHTNESS: 0
WHEEZING: 0
COLOR CHANGE: 0
VOMITING: 0

## 2020-02-05 NOTE — PROGRESS NOTES
[facility] ambulatory encounter  FAMILY PRACTICE ANNUAL PHYSICAL EXAM    CHIEF COMPLAINT:  Establish Care (annual exam, pap, labs)       HISTORY OF PRESENT ILLNESS:    Smitha Castro is a pleasant 63 year old female who presents today for establish in practice.  Annual exam    She is G4, P 4  Last pap was , negative cytology and negative HPV.  Last mammogram 2017, benign. She is postmenopausal.   She is not sexually active.  She is agreeable to pap, mammogram, screening labs and colon screening today.    She takes occasional otc allergy medications for seasonal allergies.  Otherwise no daily medications.    Generally feels she is healthy.    New concerns discussed include: - thinks she pulled a muscle in her abdomin while shoveling. Better today.      PROBLEM LIST:    There is no problem list on file for this patient.      HISTORIES:    No current outpatient medications on file.     No current facility-administered medications for this visit.      Past Medical History:   Diagnosis Date   • Seasonal allergies      Past Surgical History:   Procedure Laterality Date   • Vaginal delivery      x 4     Social History     Tobacco Use   • Smoking status: Former Smoker     Packs/day: 0.00   • Smokeless tobacco: Never Used   Substance Use Topics   • Alcohol use: Yes     Alcohol/week: 1.0 - 2.0 standard drinks     Types: 1 - 2 Glasses of wine per week     Comment: occasional   • Drug use: Never     Social History     Tobacco Use   Smoking Status Former Smoker   • Packs/day: 0.00   Smokeless Tobacco Never Used     Social History     Substance and Sexual Activity   Alcohol Use Yes   • Alcohol/week: 1.0 - 2.0 standard drinks   • Types: 1 - 2 Glasses of wine per week    Comment: occasional     Family History   Problem Relation Age of Onset   • Dementia/Alzheimers Mother          in 80's   • Stroke/TIA Mother    • Heart disease Father          at 80   • COPD Father         smoker   • Patient is unaware of any medical problems  One level  Home Access: Stairs to enter without rails (one step)  Bathroom Shower/Tub: Tub/Shower unit (not using currently, no grab bars)  Bathroom Toilet:  (elevated seat)  Home Equipment: Rolling walker;Cane  Receives Help From: Family  Active : Yes  Mode of Transportation: Car  Occupation: Part time employment  Type of occupation: off d/t injury, 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[employer], has to climb stairs, no restrictions allowed  Leisure & Hobbies: none, shopping--walking  Objective     Observation/Palpation  Palpation: TTP from troch down ITB to knee and even quad tendon some   Observation: ambulating w/ straight cane, antalgic w/ decreased knee flex/ext and hip flex/ext, some trendelenburg noted too, genu varus noted B     AROM RLE (degrees)  RLE General AROM: hip flex 120 supine, seated ER 20  IR 25 , standing ext 10    Strength RLE  Comment: painful knee flex and ext 4+/5, hip flex 3-/5, Hip ext 4/5 mild pain  Strength LLE  Strength LLE: WNL  Comment: hip flex ext and abd 5/5      Additional Measures  Special Tests: NT d/t postop    Assessment   Conditions Requiring Skilled Therapeutic Intervention  Body structures, Functions, Activity limitations: Decreased functional mobility ; Decreased ADL status; Decreased ROM; Decreased strength;Decreased high-level IADLs    Assessment: Pt is a 78 yo female who presents almost 2 months postop R hip Fx and ORIF. She continues w/ limited hip and LE strength and has developed knee pain now as well. She demonstrates altered gait and decreased balance w/ difficulty getting up from lower surfaces as well. She will benefit from PT to help restore her prior functional level. Prior to her injury in May she had no pain or limit in R hip or knee. Patient agrees with established plan of care and assisted in the development of their short term and long term goals. Patient had no adverse reaction with initial treatment and there are no barriers to learning.  Demonstrates no Sister    • Other Sister         mva at 24   • Patient is unaware of any medical problems Daughter    • Patient is unaware of any medical problems Son    • Patient is unaware of any medical problems Son    • Patient is unaware of any medical problems Son        REVIEW OF SYSTEMS:    All other systems are reviewed and are negative except as documented in the history of present illness.     PHYSICAL EXAM:    Visit Vitals  /80 (BP Location: LUE - Left upper extremity)   Pulse 76   Resp 16   Ht 5' 7\" (1.702 m)   Wt 102.3 kg   BMI 35.32 kg/m²       General Appearance:  Alert, cooperative, no distress, appears stated age.  Head:  Normocephalic, without obvious abnormality, atraumatic.  Eyes:  Pupils equal, round and reactive to light, conjunctivae/corneas clear, extraocular movements intact, fundi benign, both eyes.  Ears:  Tympanic membranes and external ear canals normal, both ears.  Nose:  Nares normal, septum midline, mucosa normal, no drainage or sinus tenderness.  Throat:  Lips, mucosa, and tongue normal; teeth and gums normal.  Neck:  Supple, symmetrical, trachea midline, no adenopathy.  Thyroid has no enlargement/tenderness/nodules; no carotid bruit or jugular venous distention.  Back:  Symmetric, no curvature, range of motion normal, no costovertebral angle tenderness.  Lungs:  Clear to auscultation bilaterally, respirations unlabored.  Chest Wall:  No tenderness or deformity.  Heart:  Regular rate and rhythm, S1 and S2 normal, no murmur, rub or gallop.  Abdomen:  Soft, non-tender, bowel sounds active all four quadrants,  no masses, no organomegaly.  Extremities:  Atraumatic, no cyanosis or edema.  Pulses:  2+ and symmetric all extremities.  Skin:  Skin color, texture, turgor normal, no rashes or lesions.  Lymph Nodes:  Cervical, supraclavicular, and axillary nodes normal.  Neurologic:  Cranial nerves II-XII intact, normal strength, sensation and reflexes throughout.  Breast Exam:  No asymmetry, masses or  mental or cognitive disorder. Treatment Diagnosis: R hip and knee pain, weakness, stiffness, altered gait  Prognosis: Good  Decision Making: Low Complexity  Patient Education: see flow sheet  Barriers to Learning: none  REQUIRES PT FOLLOW UP: Yes         Plan   Plan  Times per week: 2x/week (all pool x3 to start)  Plan weeks: 6 weeks  Specific instructions for Next Treatment: begin pool Rx 2x/week for 3 weeks, then re-check and determine if stay at pool or add any more land based Rx  Current Treatment Recommendations: Strengthening, ROM, Functional Mobility Training, Stair training, Pain Management, Neuromuscular Re-education, Home Exercise Program, Safety Education & Training, Modalities    G-Code  PT G-Codes  Functional Assessment Tool Used: hoos  Score: 42% disability  Functional Limitation: Mobility: Walking and moving around  Mobility: Walking and Moving Around Current Status (): At least 40 percent but less than 60 percent impaired, limited or restricted  Mobility: Walking and Moving Around Goal Status ():  At least 1 percent but less than 20 percent impaired, limited or restricted    Goals  Short term goals  Time Frame for Short term goals: 3 weeks, 8/10/18  Short term goal 1: Pt will be able to report at least 25% reduction in pain   Short term goal 2: Pt will be able to advance gait and functional strength activity w/o increased pain  Short term goal 3: Pt will be able to get up from standard height toilet w/ min limit and no pain  Long term goals  Time Frame for Long term goals : 6 weeks, 8/31/18  Long term goal 1: Pt will be independent w/ HEP and able to progress on her own after PT  Long term goal 2: Pt will be able to walk w/o device at least 150 ft w/o limp or pain  Long term goal 3: Pt will be able to get up/down steps w/ min limit for possible RTW  Long term goal 4: Pt will be able to perform all ususal ADL's w/o pain  Patient Goals   Patient goals : get her independence back again, tenderness.  No nipple retraction or discharge.  Overlying skin without creasing, textural changes.   Genitalia:  External genitalia, bulbourethral and [place]'s glands without lesions.  Vaginal vault with scant secretions, no lesions.  Cervix without friability or motion tenderness.  Uterus not appreciably enlarged or tender.  Adnexa without masses or tenderness.   Pap smear obtained.   Rectal:  Not examined.       LABORATORY DATA:    Lab Services on 02/05/2020   Component Date Value Ref Range Status   • Cholesterol 02/05/2020 235* <=199 mg/dL Final   • Triglycerides 02/05/2020 111  <=149 mg/dL Final   • HDL 02/05/2020 65  >=50 mg/dL Final   • LDL 02/05/2020 148* <=129 mg/dL Final   • Non-HDL Cholesterol 02/05/2020 170  mg/dL Final   • Cholesterol/ HDL Ratio 02/05/2020 3.6  <=4.4 Final   • TSH 02/05/2020 1.259  0.350 - 5.000 mcUnits/mL Final   • Sodium 02/05/2020 142  135 - 145 mmol/L Final   • Potassium 02/05/2020 3.8  3.4 - 5.1 mmol/L Final   • Chloride 02/05/2020 105  98 - 107 mmol/L Final   • Carbon Dioxide 02/05/2020 27  21 - 32 mmol/L Final   • Anion Gap 02/05/2020 14  10 - 20 mmol/L Final   • Glucose 02/05/2020 95  65 - 99 mg/dL Final   • BUN 02/05/2020 10  6 - 20 mg/dL Final   • Creatinine 02/05/2020 0.64  0.51 - 0.95 mg/dL Final   • Glomerular Filtration Rate 02/05/2020 97   Final   • BUN/ Creatinine Ratio 02/05/2020 16  7 - 25 Final   • Bilirubin, Total 02/05/2020 0.4  0.2 - 1.0 mg/dL Final   • GOT/AST 02/05/2020 25  <=37 Units/L Final   • Alkaline Phosphatase 02/05/2020 106  45 - 117 Units/L Final   • Albumin 02/05/2020 4.1  3.6 - 5.1 g/dL Final   • Protein, Total 02/05/2020 7.7  6.4 - 8.2 g/dL Final   • Globulin 02/05/2020 3.6  2.0 - 4.0 g/dL Final   • A/G Ratio 02/05/2020 1.1  1.0 - 2.4 Final   • GPT/ALT 02/05/2020 53  <64 Units/L Final   • Calcium 02/05/2020 8.8  8.4 - 10.2 mg/dL Final   • WBC 02/05/2020 5.9  4.2 - 11.0 K/mcL Final   • RBC 02/05/2020 4.25  4.00 - 5.20 mil/mcL Final   • HGB 02/05/2020  12.9  12.0 - 15.5 g/dL Final   • HCT 02/05/2020 38.9  36.0 - 46.5 % Final   • MCV 02/05/2020 91.5  78.0 - 100.0 fl Final   • MCH 02/05/2020 30.4  26.0 - 34.0 pg Final   • MCHC 02/05/2020 33.2  32.0 - 36.5 g/dL Final   • RDW-SD 02/05/2020 40.7  39.0 - 50.0 fL Final   • RDW-CV 02/05/2020 12.0  11.0 - 15.0 % Final   • PLT 02/05/2020 249  140 - 450 K/mcL Final   • NRBC 02/05/2020 0  <=0 /100 WBC Final   • Neutrophil, Percent 02/05/2020 57  % Final   • Lymphocytes, Percent 02/05/2020 34  % Final   • Mono, Percent 02/05/2020 7  % Final   • Eosinophils, Percent 02/05/2020 1  % Final   • Basophils, Percent 02/05/2020 1  % Final   • Immature Granulocytes 02/05/2020 0  % Final   • Absolute Neutrophils 02/05/2020 3.3  1.8 - 7.7 K/mcL Final   • Absolute Lymphocytes 02/05/2020 2.0  1.0 - 4.0 K/mcL Final   • Absolute Monocytes 02/05/2020 0.4  0.3 - 0.9 K/mcL Final   • Absolute Eosinophils  02/05/2020 0.1  0.1 - 0.5 K/mcL Final   • Absolute Basophils 02/05/2020 0.1  0.0 - 0.3 K/mcL Final   • Absolute Immmature Granulocytes 02/05/2020 0.0  0.0 - 0.2 K/mcL Final        IMAGING STUDIES:    N/A    Body mass index is 35.32 kg/m².    BMI ASSESSMENT/PLAN:  Patient is overweight.    decrease in portion size,  mediterranean diet           DEPRESSION ASSESSMENT/PLAN:  Recent PHQ 2/9 Score    PHQ 2:  Date Adult PHQ 2 Score   2/5/2020 0       PHQ 9:     Depression screening is negative no further plan needed.    ASSESSMENT:    1. Annual physical exam    2. Screening breast examination    3. Lipid screening    4. Screening for thyroid disorder    5. Screening for malignant neoplasm of cervix    6. Screen for colon cancer    7. Need for hepatitis C screening test        PLAN:    Orders Placed This Encounter   • Mammo Screening Bilateral   • Lipid Panel With Reflex   • Thyroid Stimulating Hormone Reflex   • Comprehensive Metabolic Panel   • CBC with Automated Differential   • COLOGUARD   • Hepatitis C Antibody With Reflex   • Pap Test     Update  screenings.   Will contact her with results.  Return in about 1 year (around 2/5/2021) for annual exam.    Screenings/Treatments Needed:  Cardiovascular screening - Lipids , Annual mammogram , Colorectal cancer screening  and Hepatitis C    Instructions provided as documented in the after visit summary.    The patient indicated understanding of the diagnosis and agreed with the plan of care.

## 2020-10-22 NOTE — PATIENT INSTRUCTIONS
Continue weight-bearing as tolerated. Continue range of motion exercises as instructed. Ice and elevate as needed. Tylenol or Motrin for pain.   Follow up as needed   Physical therapy ordered today for strengthening

## 2020-10-22 NOTE — PROGRESS NOTES
Pt is here today for evaluation of the right hip. Pt states that on 5/28/2018 she has a fall and had to have surgery by Dr. Evone Sacks. Right hip IMN. Pt states with in the last few months she has noticed increased pain in the right hip. Pt states that she can feel a bulge in the side of her right hip. Pt denies any injury or accident to the right hip. Pt states that she does use biofreeze on the hip which does help with the pain. She has tired percocet which did help with the pain. Pt states she does use a cane at home.

## 2020-10-23 NOTE — PROGRESS NOTES
10/22/2020   Chief Complaint   Patient presents with    Hip Pain     right hip pain  s/p R-HIP IMN 5/28/2018        History of Present Illness:                             Deuce Liu is a 78 y.o. female who presents today for evaluation of her right hip pain. She previously had a intertrochanteric hip fracture 2 and half years ago and had a open reduction and internal fixation with intramedullary nail performed by myself. She went on to heal her fracture well and has been able to advance her activities. She has improved to the point that she does not require the use of a cane or walker for ambulation but sometimes use that if needed. Her only real concern is some fullness and scar tissue overlying the lateral aspect of her right hip at the surgical site of the greater trochanter. She denies any groin pain or severe stiffness in the hip. She does not have a limp but does complain of irritation with direct pressure lying on that side. She denies any new injuries or falls. Her pain is better with stretching and rest and massage. Pt is here today for evaluation of the right hip. Pt states that on 5/28/2018 she has a fall and had to have surgery by Dr. Rufino Anderson. Right hip IMN. Pt states with in the last few months she has noticed increased pain in the right hip. Pt states that she can feel a bulge in the side of her right hip. Pt denies any injury or accident to the right hip. Pt states that she does use biofreeze on the hip which does help with the pain. She has tired percocet which did help with the pain. Pt states she does use a cane at home        Medical History  Patient's medications, allergies, past medical, surgical, social and family histories were reviewed and updated as appropriate. Past Medical History:   Diagnosis Date    Arthritis     Diabetes mellitus ([facility] [address]     History of blood transfusion 05/30/2018    Hyperlipidemia     Hypertension      No family history on file.   Social History     Socioeconomic History    Marital status: Single     Spouse name: None    Number of children: None    Years of education: None    Highest education level: None   Occupational History    None   Social Needs    Financial resource strain: None    Food insecurity     Worry: None     Inability: None    Transportation needs     Medical: None     Non-medical: None   Tobacco Use    Smoking status: Never Smoker    Smokeless tobacco: Never Used   Substance and Sexual Activity    Alcohol use: No    Drug use: No    Sexual activity: Not Currently     Partners: Male   Lifestyle    Physical activity     Days per week: None     Minutes per session: None    Stress: None   Relationships    Social connections     Talks on phone: None     Gets together: None     Attends Muslim service: None     Active member of club or organization: None     Attends meetings of clubs or organizations: None     Relationship status: None    Intimate partner violence     Fear of current or ex partner: None     Emotionally abused: None     Physically abused: None     Forced sexual activity: None   Other Topics Concern    None   Social History Narrative    None     Current Outpatient Medications   Medication Sig Dispense Refill    gabapentin (NEURONTIN) 300 MG capsule       lisinopril (PRINIVIL;ZESTRIL) 5 MG tablet TK 1 T PO QD.      ofloxacin (OCUFLOX) 0.3 % solution PLACE 1 GTT INTO OD QID BEGINING AFTER SURGERY      prednisoLONE acetate (PRED FORTE) 1 % ophthalmic suspension PLACE 1 GTT INTO OD QID BEGINING AFTER SURGERY      sertraline (ZOLOFT) 25 MG tablet       rivaroxaban (XARELTO) 10 MG TABS tablet Take 1 tablet by mouth daily 24 tablet 0    insulin glargine (LANTUS) 100 UNIT/ML injection vial Inject 10 Units into the skin nightly 1 vial 0    insulin lispro (HUMALOG) 100 UNIT/ML injection vial Inject 0-12 Units into the skin 3 times daily (with meals) 1 vial 3    linagliptin (TRADJENTA) 5 MG tablet Take 1 tablet by mouth daily 30 tablet 0    metFORMIN (GLUCOPHAGE) 1000 MG tablet Take 1 tablet by mouth 2 times daily (with meals) 60 tablet 0    metoprolol tartrate (LOPRESSOR) 25 MG tablet Take 1 tablet by mouth 2 times daily 60 tablet 0    docusate sodium (COLACE, DULCOLAX) 100 MG CAPS Take 100 mg by mouth 2 times daily      atorvastatin (LIPITOR) 40 MG tablet Take 40 mg by mouth daily      aspirin 81 MG tablet Take 81 mg by mouth daily       No current facility-administered medications for this visit. No Known Allergies    Review of Systems:   Review of Systems   Constitutional: Negative for chills and fever. HENT: Negative for congestion and sneezing. Eyes: Negative for pain and redness. Respiratory: Negative for chest tightness, shortness of breath and wheezing. Cardiovascular: Negative for chest pain and palpitations. Gastrointestinal: Negative for vomiting. Musculoskeletal: Positive for arthralgias. Skin: Negative for color change and rash. Neurological: Negative for weakness and numbness. Psychiatric/Behavioral: Negative for agitation. The patient is not nervous/anxious. Examination:  General Exam:  Vitals: Pulse 76   Resp 15   Ht 5' 1\" (1.549 m)   Wt 132 lb (59.9 kg)   SpO2 98%   BMI 24.94 kg/m²    Physical Exam  Vitals signs and nursing note reviewed. Constitutional:       Appearance: Normal appearance. HENT:      Head: Normocephalic and atraumatic. Eyes:      Conjunctiva/sclera: Conjunctivae normal.      Pupils: Pupils are equal, round, and reactive to light. Neck:      Musculoskeletal: Normal range of motion. Pulmonary:      Effort: Pulmonary effort is normal.   Musculoskeletal:      Left hip: She exhibits normal range of motion, normal strength, no tenderness, no bony tenderness, no swelling and no deformity.       Right knee: She exhibits normal range of motion, no swelling, no effusion, no ecchymosis, no laceration, no erythema, no LCL laxity, no bony tenderness and no MCL laxity. No medial joint line and no lateral joint line tenderness noted. Left knee: Normal.      Lumbar back: She exhibits normal range of motion, no tenderness and no deformity. Comments: Right lower extremity:  Well-healed surgical scars over the lateral thigh and lateral hip. Mild tenderness along some areas of firmness in the scar tissue of the hip abductors. Painless active range of motion of the hip with no restricted mobility and no crepitation. Strength is 5 out of 5 with hip flexion and extension and abduction. She is able to ambulate well without a cane today. Skin is intact. No drainage or erythema or wound healing problems. Leg lengths are equal.  No tenderness to palpation at the knee or ankle. Sensation and motor function is intact distally. No instability at the hip. Skin:     General: Skin is warm and dry. Neurological:      Mental Status: She is alert and oriented to person, place, and time. Psychiatric:         Mood and Affect: Mood normal.         Behavior: Behavior normal.            Diagnostic testing:  X-rays reviewed in office, I independently reviewed the films in the office today:       Office Procedures:  Orders Placed This Encounter   Procedures   [facility] Physical Therapy     Referral Priority:   Routine     Referral Type:   Eval and Treat     Referral Reason:   Specialty Services Required     Requested Specialty:   Physical Therapy     Number of Visits Requested:   1       Assessment and Plan  1. Healed right hip intertrochanteric fracture    I have reassured her that the x-rays show excellent healing of her fracture site  Alignment of her hip with no significant evidence of posttraumatic changes. We discussed the hardware which is in stable position and does not show any signs of loosening or prominence.   I explained to her that her symptoms are consistent with a scar tissue and tightness in the hip abductor musculature. I recommended we proceed with formal physical therapy to help with stretching and rehabilitation of the hip. I have ordered that today as an outpatient. She can advance her activities as tolerated and transition to home exercise program.  I recommended over-the-counter anti-inflammatory medications only for this problem. Follow-up as needed. If her symptoms do not respond to therapy we could try a steroid injection.     Electronically signed by Araceli Bella MD on 10/23/2020 at 7:37 AM

## 2021-01-01 ENCOUNTER — APPOINTMENT (OUTPATIENT)
Dept: CT IMAGING | Age: 71
DRG: 246 | End: 2021-01-01
Payer: MEDICARE

## 2021-01-01 ENCOUNTER — APPOINTMENT (OUTPATIENT)
Dept: INTERVENTIONAL RADIOLOGY/VASCULAR | Age: 71
DRG: 246 | End: 2021-01-01
Payer: MEDICARE

## 2021-01-01 ENCOUNTER — HOSPITAL ENCOUNTER (INPATIENT)
Age: 71
LOS: 1 days | DRG: 246 | End: 2021-02-10
Attending: EMERGENCY MEDICINE | Admitting: INTERNAL MEDICINE
Payer: MEDICARE

## 2021-01-01 ENCOUNTER — APPOINTMENT (OUTPATIENT)
Dept: GENERAL RADIOLOGY | Age: 71
DRG: 246 | End: 2021-01-01
Payer: MEDICARE

## 2021-01-01 VITALS
BODY MASS INDEX: 24.92 KG/M2 | WEIGHT: 132 LBS | TEMPERATURE: 91.2 F | RESPIRATION RATE: 16 BRPM | HEIGHT: 61 IN | OXYGEN SATURATION: 21 %

## 2021-01-01 DIAGNOSIS — I44.2 COMPLETE HEART BLOCK (HCC): Primary | ICD-10-CM

## 2021-01-01 DIAGNOSIS — J96.00 ACUTE RESPIRATORY FAILURE, UNSPECIFIED WHETHER WITH HYPOXIA OR HYPERCAPNIA (HCC): ICD-10-CM

## 2021-01-01 DIAGNOSIS — E87.5 HYPERKALEMIA: ICD-10-CM

## 2021-01-01 LAB
ACTIVATED CLOTTING TIME, LOW RANGE: 343 SEC
ALBUMIN SERPL-MCNC: 3.1 GM/DL (ref 3.4–5)
ALP BLD-CCNC: 96 IU/L (ref 40–128)
ALT SERPL-CCNC: 199 U/L (ref 10–40)
ANION GAP SERPL CALCULATED.3IONS-SCNC: 16 MMOL/L (ref 4–16)
ANION GAP SERPL CALCULATED.3IONS-SCNC: ABNORMAL MMOL/L (ref 4–16)
APTT: 84.9 SECONDS (ref 25.1–37.1)
AST SERPL-CCNC: 183 IU/L (ref 15–37)
BASE EXCESS MIXED: 10.4 (ref 0–2.3)
BASE EXCESS MIXED: 5.2 (ref 0–2.3)
BASE EXCESS: 13 (ref 0–2.4)
BASE EXCESS: ABNORMAL (ref 0–2.4)
BASE EXCESS: ABNORMAL (ref 0–2.4)
BASOPHILS ABSOLUTE: 0 K/CU MM
BASOPHILS RELATIVE PERCENT: 0.2 % (ref 0–1)
BETA-HYDROXYBUTYRATE: 1.5 MG/DL (ref 0–3)
BILIRUB SERPL-MCNC: 0.4 MG/DL (ref 0–1)
BUN BLDV-MCNC: 44 MG/DL (ref 6–23)
BUN BLDV-MCNC: 59 MG/DL (ref 6–23)
CALCIUM IONIZED: 5.16 MG/DL (ref 4.48–5.28)
CALCIUM SERPL-MCNC: 8 MG/DL (ref 8.3–10.6)
CALCIUM SERPL-MCNC: 9.6 MG/DL (ref 8.3–10.6)
CARBON MONOXIDE, BLOOD: 1 % (ref 0–5)
CHLORIDE BLD-SCNC: 108 MMOL/L (ref 99–110)
CHLORIDE BLD-SCNC: 99 MMOL/L (ref 99–110)
CO2 CONTENT: 15.7 MMOL/L (ref 19–24)
CO2: 13 MMOL/L (ref 21–32)
CO2: 19 MMOL/L (ref 21–32)
CO2: 23 MMOL/L (ref 21–32)
CO2: >50 MMOL/L (ref 21–32)
COMMENT: ABNORMAL
CREAT SERPL-MCNC: 1.9 MG/DL (ref 0.6–1.1)
CREAT SERPL-MCNC: 2.9 MG/DL (ref 0.6–1.1)
DIFFERENTIAL TYPE: ABNORMAL
EOSINOPHILS ABSOLUTE: 0 K/CU MM
EOSINOPHILS RELATIVE PERCENT: 0.4 % (ref 0–3)
ESTIMATED AVERAGE GLUCOSE: 332 MG/DL
GFR AFRICAN AMERICAN: 20 ML/MIN/1.73M2
GFR AFRICAN AMERICAN: 27 ML/MIN/1.73M2
GFR AFRICAN AMERICAN: 32 ML/MIN/1.73M2
GFR NON-AFRICAN AMERICAN: 16 ML/MIN/1.73M2
GFR NON-AFRICAN AMERICAN: 23 ML/MIN/1.73M2
GFR NON-AFRICAN AMERICAN: 26 ML/MIN/1.73M2
GLUCOSE BLD-MCNC: 445 MG/DL (ref 70–99)
GLUCOSE BLD-MCNC: 502 MG/DL (ref 70–99)
GLUCOSE BLD-MCNC: 531 MG/DL (ref 70–99)
GLUCOSE BLD-MCNC: 642 MG/DL (ref 70–99)
HBA1C MFR BLD: 13.2 % (ref 4.2–6.3)
HCO3 ARTERIAL: 14.5 MMOL/L (ref 18–23)
HCO3 ARTERIAL: 17.7 MMOL/L (ref 18–23)
HCO3 ARTERIAL: 21.4 MMOL/L (ref 18–23)
HCT VFR BLD CALC: 22 % (ref 37–47)
HCT VFR BLD CALC: 23 % (ref 37–47)
HCT VFR BLD CALC: 30 % (ref 37–47)
HEMOGLOBIN: 10 GM/DL (ref 12.5–16)
HEMOGLOBIN: 7.6 GM/DL (ref 12.5–16)
HEMOGLOBIN: 8 GM/DL (ref 12.5–16)
IMMATURE NEUTROPHIL %: 0.4 % (ref 0–0.43)
INR BLD: 0.97 INDEX
INR BLD: 1.26 INDEX
IONIZED CA: 1.29 MMOL/L (ref 1.12–1.32)
LACTATE: 6.5 MMOL/L (ref 0.4–2)
LYMPHOCYTES ABSOLUTE: 3 K/CU MM
LYMPHOCYTES RELATIVE PERCENT: 55.9 % (ref 24–44)
MAGNESIUM: 1.7 MG/DL (ref 1.8–2.4)
MAGNESIUM: 2.2 MG/DL (ref 1.8–2.4)
MCH RBC QN AUTO: 28.1 PG (ref 27–31)
MCHC RBC AUTO-ENTMCNC: 33.3 % (ref 32–36)
MCV RBC AUTO: 84.3 FL (ref 78–100)
METHEMOGLOBIN ARTERIAL: 0.8 %
MONOCYTES ABSOLUTE: 0.2 K/CU MM
MONOCYTES RELATIVE PERCENT: 4.4 % (ref 0–4)
NUCLEATED RBC %: 0 %
O2 SATURATION: 77.7 % (ref 96–97)
O2 SATURATION: 82.5 % (ref 96–97)
O2 SATURATION: 95.9 % (ref 96–97)
PCO2 ARTERIAL: 38 MMHG (ref 32–45)
PCO2 ARTERIAL: 46.5 MMHG (ref 32–45)
PCO2 ARTERIAL: 49.6 MMHG (ref 32–45)
PDW BLD-RTO: 13.9 % (ref 11.7–14.9)
PH BLOOD: 7.16 (ref 7.34–7.45)
PH BLOOD: 7.19 (ref 7.34–7.45)
PH BLOOD: 7.27 (ref 7.34–7.45)
PHOSPHORUS: 5.1 MG/DL (ref 2.5–4.9)
PLATELET # BLD: 156 K/CU MM (ref 140–440)
PMV BLD AUTO: 11.6 FL (ref 7.5–11.1)
PO2 ARTERIAL: 101 MMHG (ref 75–100)
PO2 ARTERIAL: 53.5 MMHG (ref 75–100)
PO2 ARTERIAL: 53.8 MMHG (ref 75–100)
POC CALCIUM: 1.26 MMOL/L (ref 1.12–1.32)
POC CALCIUM: 1.49 MMOL/L (ref 1.12–1.32)
POC CHLORIDE: 110 MMOL/L (ref 98–109)
POC CHLORIDE: 114 MMOL/L (ref 98–109)
POC CREATININE: 2.2 MG/DL (ref 0.6–1.1)
POC CREATININE: 2.2 MG/DL (ref 0.6–1.1)
POTASSIUM SERPL-SCNC: 2.8 MMOL/L (ref 3.5–4.5)
POTASSIUM SERPL-SCNC: 3.5 MMOL/L (ref 3.5–5.1)
POTASSIUM SERPL-SCNC: 3.8 MMOL/L (ref 3.5–4.5)
POTASSIUM SERPL-SCNC: 5.8 MMOL/L (ref 3.5–5.1)
PRO-BNP: 1949 PG/ML
PROTHROMBIN TIME: 11.7 SECONDS (ref 11.7–14.5)
PROTHROMBIN TIME: 15.3 SECONDS (ref 11.7–14.5)
RBC # BLD: 3.56 M/CU MM (ref 4.2–5.4)
SEGMENTED NEUTROPHILS ABSOLUTE COUNT: 2.1 K/CU MM
SEGMENTED NEUTROPHILS RELATIVE PERCENT: 38.7 % (ref 36–66)
SODIUM BLD-SCNC: 128 MMOL/L (ref 135–145)
SODIUM BLD-SCNC: 152 MMOL/L (ref 138–146)
SODIUM BLD-SCNC: 152 MMOL/L (ref 138–146)
SODIUM BLD-SCNC: 177 MMOL/L (ref 135–145)
SOURCE, BLOOD GAS: ABNORMAL
SOURCE, BLOOD GAS: ABNORMAL
TOTAL IMMATURE NEUTOROPHIL: 0.02 K/CU MM
TOTAL NUCLEATED RBC: 0 K/CU MM
TOTAL PROTEIN: 5.7 GM/DL (ref 6.4–8.2)
TROPONIN T: 0.06 NG/ML
TSH HIGH SENSITIVITY: 6.54 UIU/ML (ref 0.27–4.2)
WBC # BLD: 5.4 K/CU MM (ref 4–10.5)

## 2021-01-01 PROCEDURE — 2500000003 HC RX 250 WO HCPCS

## 2021-01-01 PROCEDURE — 2580000003 HC RX 258: Performed by: FAMILY MEDICINE

## 2021-01-01 PROCEDURE — 5A12012 PERFORMANCE OF CARDIAC OUTPUT, SINGLE, MANUAL: ICD-10-PCS | Performed by: INTERNAL MEDICINE

## 2021-01-01 PROCEDURE — 85610 PROTHROMBIN TIME: CPT

## 2021-01-01 PROCEDURE — 85347 COAGULATION TIME ACTIVATED: CPT

## 2021-01-01 PROCEDURE — 84484 ASSAY OF TROPONIN QUANT: CPT

## 2021-01-01 PROCEDURE — 85025 COMPLETE CBC W/AUTO DIFF WBC: CPT

## 2021-01-01 PROCEDURE — 2580000003 HC RX 258

## 2021-01-01 PROCEDURE — C1894 INTRO/SHEATH, NON-LASER: HCPCS

## 2021-01-01 PROCEDURE — 85730 THROMBOPLASTIN TIME PARTIAL: CPT

## 2021-01-01 PROCEDURE — 2500000003 HC RX 250 WO HCPCS: Performed by: EMERGENCY MEDICINE

## 2021-01-01 PROCEDURE — 6370000000 HC RX 637 (ALT 250 FOR IP)

## 2021-01-01 PROCEDURE — 99291 CRITICAL CARE FIRST HOUR: CPT | Performed by: INTERNAL MEDICINE

## 2021-01-01 PROCEDURE — 82010 KETONE BODYS QUAN: CPT

## 2021-01-01 PROCEDURE — 027034Z DILATION OF CORONARY ARTERY, ONE ARTERY WITH DRUG-ELUTING INTRALUMINAL DEVICE, PERCUTANEOUS APPROACH: ICD-10-PCS | Performed by: INTERNAL MEDICINE

## 2021-01-01 PROCEDURE — C9600 PERC DRUG-EL COR STENT SING: HCPCS

## 2021-01-01 PROCEDURE — 6360000002 HC RX W HCPCS: Performed by: INTERNAL MEDICINE

## 2021-01-01 PROCEDURE — 83735 ASSAY OF MAGNESIUM: CPT

## 2021-01-01 PROCEDURE — 2580000003 HC RX 258: Performed by: INTERNAL MEDICINE

## 2021-01-01 PROCEDURE — C1769 GUIDE WIRE: HCPCS

## 2021-01-01 PROCEDURE — 84100 ASSAY OF PHOSPHORUS: CPT

## 2021-01-01 PROCEDURE — 2709999900 HC NON-CHARGEABLE SUPPLY

## 2021-01-01 PROCEDURE — C1874 STENT, COATED/COV W/DEL SYS: HCPCS

## 2021-01-01 PROCEDURE — 6360000002 HC RX W HCPCS

## 2021-01-01 PROCEDURE — 93308 TTE F-UP OR LMTD: CPT

## 2021-01-01 PROCEDURE — 87040 BLOOD CULTURE FOR BACTERIA: CPT

## 2021-01-01 PROCEDURE — 2500000003 HC RX 250 WO HCPCS: Performed by: INTERNAL MEDICINE

## 2021-01-01 PROCEDURE — 92950 HEART/LUNG RESUSCITATION CPR: CPT

## 2021-01-01 PROCEDURE — 2700000000 HC OXYGEN THERAPY PER DAY

## 2021-01-01 PROCEDURE — 70450 CT HEAD/BRAIN W/O DYE: CPT

## 2021-01-01 PROCEDURE — B2111ZZ FLUOROSCOPY OF MULTIPLE CORONARY ARTERIES USING LOW OSMOLAR CONTRAST: ICD-10-PCS | Performed by: INTERNAL MEDICINE

## 2021-01-01 PROCEDURE — 92941 PRQ TRLML REVSC TOT OCCL AMI: CPT | Performed by: INTERNAL MEDICINE

## 2021-01-01 PROCEDURE — 84443 ASSAY THYROID STIM HORMONE: CPT

## 2021-01-01 PROCEDURE — 6360000002 HC RX W HCPCS: Performed by: EMERGENCY MEDICINE

## 2021-01-01 PROCEDURE — 6360000004 HC RX CONTRAST MEDICATION

## 2021-01-01 PROCEDURE — 5A1935Z RESPIRATORY VENTILATION, LESS THAN 24 CONSECUTIVE HOURS: ICD-10-PCS | Performed by: INTERNAL MEDICINE

## 2021-01-01 PROCEDURE — 82803 BLOOD GASES ANY COMBINATION: CPT

## 2021-01-01 PROCEDURE — 31500 INSERT EMERGENCY AIRWAY: CPT

## 2021-01-01 PROCEDURE — C1751 CATH, INF, PER/CENT/MIDLINE: HCPCS

## 2021-01-01 PROCEDURE — 94002 VENT MGMT INPAT INIT DAY: CPT

## 2021-01-01 PROCEDURE — 5A1223Z PERFORMANCE OF CARDIAC PACING, CONTINUOUS: ICD-10-PCS | Performed by: INTERNAL MEDICINE

## 2021-01-01 PROCEDURE — 80053 COMPREHEN METABOLIC PANEL: CPT

## 2021-01-01 PROCEDURE — 92950 HEART/LUNG RESUSCITATION CPR: CPT | Performed by: INTERNAL MEDICINE

## 2021-01-01 PROCEDURE — 83880 ASSAY OF NATRIURETIC PEPTIDE: CPT

## 2021-01-01 PROCEDURE — 93454 CORONARY ARTERY ANGIO S&I: CPT | Performed by: INTERNAL MEDICINE

## 2021-01-01 PROCEDURE — 89220 SPUTUM SPECIMEN COLLECTION: CPT

## 2021-01-01 PROCEDURE — 2720000010 HC SURG SUPPLY STERILE

## 2021-01-01 PROCEDURE — 6370000000 HC RX 637 (ALT 250 FOR IP): Performed by: FAMILY MEDICINE

## 2021-01-01 PROCEDURE — C1887 CATHETER, GUIDING: HCPCS

## 2021-01-01 PROCEDURE — 82330 ASSAY OF CALCIUM: CPT

## 2021-01-01 PROCEDURE — 99291 CRITICAL CARE FIRST HOUR: CPT

## 2021-01-01 PROCEDURE — 93454 CORONARY ARTERY ANGIO S&I: CPT

## 2021-01-01 PROCEDURE — 2000000000 HC ICU R&B

## 2021-01-01 PROCEDURE — 71045 X-RAY EXAM CHEST 1 VIEW: CPT

## 2021-01-01 PROCEDURE — 36600 WITHDRAWAL OF ARTERIAL BLOOD: CPT

## 2021-01-01 PROCEDURE — 83036 HEMOGLOBIN GLYCOSYLATED A1C: CPT

## 2021-01-01 PROCEDURE — 80048 BASIC METABOLIC PNL TOTAL CA: CPT

## 2021-01-01 PROCEDURE — 81001 URINALYSIS AUTO W/SCOPE: CPT

## 2021-01-01 PROCEDURE — 94761 N-INVAS EAR/PLS OXIMETRY MLT: CPT

## 2021-01-01 PROCEDURE — 93005 ELECTROCARDIOGRAM TRACING: CPT | Performed by: EMERGENCY MEDICINE

## 2021-01-01 PROCEDURE — 83605 ASSAY OF LACTIC ACID: CPT

## 2021-01-01 PROCEDURE — 99222 1ST HOSP IP/OBS MODERATE 55: CPT | Performed by: INTERNAL MEDICINE

## 2021-01-01 PROCEDURE — C1752 CATH,HEMODIALYSIS,SHORT-TERM: HCPCS

## 2021-01-01 RX ORDER — EPINEPHRINE 0.1 MG/ML
SYRINGE (ML) INJECTION
Status: COMPLETED | OUTPATIENT
Start: 2021-01-01 | End: 2021-01-01

## 2021-01-01 RX ORDER — HEPARIN SODIUM 5000 [USP'U]/ML
5000 INJECTION, SOLUTION INTRAVENOUS; SUBCUTANEOUS EVERY 8 HOURS SCHEDULED
Status: DISCONTINUED | OUTPATIENT
Start: 2021-01-01 | End: 2021-01-01 | Stop reason: HOSPADM

## 2021-01-01 RX ORDER — ONDANSETRON 2 MG/ML
4 INJECTION INTRAMUSCULAR; INTRAVENOUS EVERY 6 HOURS PRN
Status: DISCONTINUED | OUTPATIENT
Start: 2021-01-01 | End: 2021-01-01 | Stop reason: HOSPADM

## 2021-01-01 RX ORDER — ROCURONIUM BROMIDE 10 MG/ML
INJECTION, SOLUTION INTRAVENOUS DAILY PRN
Status: COMPLETED | OUTPATIENT
Start: 2021-01-01 | End: 2021-01-01

## 2021-01-01 RX ORDER — HEPARIN SODIUM 5000 [USP'U]/ML
5000 INJECTION, SOLUTION INTRAVENOUS; SUBCUTANEOUS EVERY 8 HOURS SCHEDULED
Status: DISCONTINUED | OUTPATIENT
Start: 2021-01-01 | End: 2021-01-01 | Stop reason: SDUPTHER

## 2021-01-01 RX ORDER — AMIODARONE HYDROCHLORIDE 50 MG/ML
INJECTION, SOLUTION INTRAVENOUS DAILY PRN
Status: COMPLETED | OUTPATIENT
Start: 2021-01-01 | End: 2021-01-01

## 2021-01-01 RX ORDER — SODIUM CHLORIDE 0.9 % (FLUSH) 0.9 %
10 SYRINGE (ML) INJECTION PRN
Status: DISCONTINUED | OUTPATIENT
Start: 2021-01-01 | End: 2021-01-01 | Stop reason: HOSPADM

## 2021-01-01 RX ORDER — LIDOCAINE HYDROCHLORIDE 10 MG/ML
5 INJECTION, SOLUTION EPIDURAL; INFILTRATION; INTRACAUDAL; PERINEURAL ONCE
Status: DISCONTINUED | OUTPATIENT
Start: 2021-01-01 | End: 2021-01-01 | Stop reason: HOSPADM

## 2021-01-01 RX ORDER — ASPIRIN 300 MG/1
300 SUPPOSITORY RECTAL DAILY
Status: DISCONTINUED | OUTPATIENT
Start: 2021-01-01 | End: 2021-01-01 | Stop reason: HOSPADM

## 2021-01-01 RX ORDER — PROMETHAZINE HYDROCHLORIDE 25 MG/1
12.5 TABLET ORAL EVERY 6 HOURS PRN
Status: DISCONTINUED | OUTPATIENT
Start: 2021-01-01 | End: 2021-01-01 | Stop reason: HOSPADM

## 2021-01-01 RX ORDER — ONDANSETRON 2 MG/ML
4 INJECTION INTRAMUSCULAR; INTRAVENOUS EVERY 6 HOURS PRN
Status: DISCONTINUED | OUTPATIENT
Start: 2021-01-01 | End: 2021-01-01 | Stop reason: SDUPTHER

## 2021-01-01 RX ORDER — POTASSIUM CHLORIDE 29.8 MG/ML
20 INJECTION INTRAVENOUS PRN
Status: DISCONTINUED | OUTPATIENT
Start: 2021-01-01 | End: 2021-01-01 | Stop reason: HOSPADM

## 2021-01-01 RX ORDER — ATROPINE SULFATE 0.1 MG/ML
INJECTION INTRAVENOUS DAILY PRN
Status: COMPLETED | OUTPATIENT
Start: 2021-01-01 | End: 2021-01-01

## 2021-01-01 RX ORDER — SODIUM CHLORIDE 9 MG/ML
INJECTION, SOLUTION INTRAVENOUS CONTINUOUS
Status: DISCONTINUED | OUTPATIENT
Start: 2021-01-01 | End: 2021-01-01 | Stop reason: HOSPADM

## 2021-01-01 RX ORDER — SODIUM CHLORIDE 0.9 % (FLUSH) 0.9 %
10 SYRINGE (ML) INJECTION EVERY 12 HOURS SCHEDULED
Status: DISCONTINUED | OUTPATIENT
Start: 2021-01-01 | End: 2021-01-01 | Stop reason: HOSPADM

## 2021-01-01 RX ORDER — EPINEPHRINE 0.1 MG/ML
SYRINGE (ML) INJECTION DAILY PRN
Status: COMPLETED | OUTPATIENT
Start: 2021-01-01 | End: 2021-01-01

## 2021-01-01 RX ORDER — IPRATROPIUM BROMIDE AND ALBUTEROL SULFATE 2.5; .5 MG/3ML; MG/3ML
1 SOLUTION RESPIRATORY (INHALATION) 4 TIMES DAILY PRN
Status: DISCONTINUED | OUTPATIENT
Start: 2021-01-01 | End: 2021-01-01 | Stop reason: HOSPADM

## 2021-01-01 RX ORDER — POTASSIUM CHLORIDE 7.45 MG/ML
10 INJECTION INTRAVENOUS PRN
Status: DISCONTINUED | OUTPATIENT
Start: 2021-01-01 | End: 2021-01-01 | Stop reason: HOSPADM

## 2021-01-01 RX ORDER — PRASUGREL 10 MG/1
10 TABLET, FILM COATED ORAL DAILY
Status: DISCONTINUED | OUTPATIENT
Start: 2021-02-11 | End: 2021-01-01 | Stop reason: HOSPADM

## 2021-01-01 RX ORDER — MAGNESIUM SULFATE IN WATER 40 MG/ML
2000 INJECTION, SOLUTION INTRAVENOUS PRN
Status: DISCONTINUED | OUTPATIENT
Start: 2021-01-01 | End: 2021-01-01 | Stop reason: HOSPADM

## 2021-01-01 RX ORDER — CALCIUM GLUCONATE 20 MG/ML
1000 INJECTION, SOLUTION INTRAVENOUS ONCE
Status: DISCONTINUED | OUTPATIENT
Start: 2021-01-01 | End: 2021-01-01 | Stop reason: HOSPADM

## 2021-01-01 RX ORDER — ALBUTEROL SULFATE 90 UG/1
4 AEROSOL, METERED RESPIRATORY (INHALATION) 4 TIMES DAILY
Status: DISCONTINUED | OUTPATIENT
Start: 2021-01-01 | End: 2021-01-01 | Stop reason: HOSPADM

## 2021-01-01 RX ORDER — ETOMIDATE 2 MG/ML
INJECTION INTRAVENOUS DAILY PRN
Status: COMPLETED | OUTPATIENT
Start: 2021-01-01 | End: 2021-01-01

## 2021-01-01 RX ORDER — DEXTROSE MONOHYDRATE 25 G/50ML
12.5 INJECTION, SOLUTION INTRAVENOUS PRN
Status: DISCONTINUED | OUTPATIENT
Start: 2021-01-01 | End: 2021-01-01 | Stop reason: SDUPTHER

## 2021-01-01 RX ORDER — CALCIUM GLUCONATE 94 MG/ML
INJECTION, SOLUTION INTRAVENOUS
Status: COMPLETED | OUTPATIENT
Start: 2021-01-01 | End: 2021-01-01

## 2021-01-01 RX ORDER — DEXTROSE MONOHYDRATE 50 MG/ML
100 INJECTION, SOLUTION INTRAVENOUS PRN
Status: DISCONTINUED | OUTPATIENT
Start: 2021-01-01 | End: 2021-01-01 | Stop reason: HOSPADM

## 2021-01-01 RX ORDER — POTASSIUM CHLORIDE 20 MEQ/1
40 TABLET, EXTENDED RELEASE ORAL PRN
Status: DISCONTINUED | OUTPATIENT
Start: 2021-01-01 | End: 2021-01-01 | Stop reason: HOSPADM

## 2021-01-01 RX ORDER — 0.9 % SODIUM CHLORIDE 0.9 %
1000 INTRAVENOUS SOLUTION INTRAVENOUS PRN
Status: DISCONTINUED | OUTPATIENT
Start: 2021-01-01 | End: 2021-01-01 | Stop reason: HOSPADM

## 2021-01-01 RX ORDER — CALCIUM GLUCONATE 20 MG/ML
1 INJECTION, SOLUTION INTRAVENOUS PRN
Status: DISCONTINUED | OUTPATIENT
Start: 2021-01-01 | End: 2021-01-01 | Stop reason: HOSPADM

## 2021-01-01 RX ORDER — MAGNESIUM SULFATE 1 G/100ML
1000 INJECTION INTRAVENOUS PRN
Status: DISCONTINUED | OUTPATIENT
Start: 2021-01-01 | End: 2021-01-01 | Stop reason: HOSPADM

## 2021-01-01 RX ORDER — NICOTINE POLACRILEX 4 MG
15 LOZENGE BUCCAL PRN
Status: DISCONTINUED | OUTPATIENT
Start: 2021-01-01 | End: 2021-01-01 | Stop reason: HOSPADM

## 2021-01-01 RX ORDER — PREDNISOLONE ACETATE 10 MG/ML
1 SUSPENSION/ DROPS OPHTHALMIC EVERY 6 HOURS SCHEDULED
Status: DISCONTINUED | OUTPATIENT
Start: 2021-01-01 | End: 2021-01-01 | Stop reason: HOSPADM

## 2021-01-01 RX ORDER — INSULIN GLARGINE 100 [IU]/ML
10 INJECTION, SOLUTION SUBCUTANEOUS NIGHTLY
Status: DISCONTINUED | OUTPATIENT
Start: 2021-01-01 | End: 2021-01-01 | Stop reason: HOSPADM

## 2021-01-01 RX ORDER — DOPAMINE HYDROCHLORIDE 160 MG/100ML
INJECTION, SOLUTION INTRAVENOUS
Status: DISCONTINUED
Start: 2021-01-01 | End: 2021-01-01 | Stop reason: HOSPADM

## 2021-01-01 RX ORDER — ATORVASTATIN CALCIUM 40 MG/1
40 TABLET, FILM COATED ORAL DAILY
Status: DISCONTINUED | OUTPATIENT
Start: 2021-01-01 | End: 2021-01-01 | Stop reason: HOSPADM

## 2021-01-01 RX ORDER — PROMETHAZINE HYDROCHLORIDE 25 MG/1
12.5 TABLET ORAL EVERY 6 HOURS PRN
Status: DISCONTINUED | OUTPATIENT
Start: 2021-01-01 | End: 2021-01-01 | Stop reason: SDUPTHER

## 2021-01-01 RX ORDER — IPRATROPIUM BROMIDE AND ALBUTEROL SULFATE 2.5; .5 MG/3ML; MG/3ML
1 SOLUTION RESPIRATORY (INHALATION) 4 TIMES DAILY
Status: DISCONTINUED | OUTPATIENT
Start: 2021-01-01 | End: 2021-01-01

## 2021-01-01 RX ORDER — DOPAMINE HYDROCHLORIDE 160 MG/100ML
2-20 INJECTION, SOLUTION INTRAVENOUS CONTINUOUS
Status: DISCONTINUED | OUTPATIENT
Start: 2021-01-01 | End: 2021-01-01 | Stop reason: HOSPADM

## 2021-01-01 RX ORDER — PROMETHAZINE HYDROCHLORIDE 12.5 MG/1
12.5 TABLET ORAL EVERY 6 HOURS PRN
Status: DISCONTINUED | OUTPATIENT
Start: 2021-01-01 | End: 2021-01-01 | Stop reason: SDUPTHER

## 2021-01-01 RX ORDER — FUROSEMIDE 10 MG/ML
40 INJECTION INTRAMUSCULAR; INTRAVENOUS ONCE
Status: COMPLETED | OUTPATIENT
Start: 2021-01-01 | End: 2021-01-01

## 2021-01-01 RX ORDER — ASPIRIN 81 MG/1
81 TABLET ORAL DAILY
Status: DISCONTINUED | OUTPATIENT
Start: 2021-02-11 | End: 2021-01-01 | Stop reason: HOSPADM

## 2021-01-01 RX ORDER — NICOTINE POLACRILEX 4 MG
15 LOZENGE BUCCAL PRN
Status: DISCONTINUED | OUTPATIENT
Start: 2021-01-01 | End: 2021-01-01 | Stop reason: SDUPTHER

## 2021-01-01 RX ORDER — CALCIUM CHLORIDE 100 MG/ML
INJECTION INTRAVENOUS; INTRAVENTRICULAR DAILY PRN
Status: COMPLETED | OUTPATIENT
Start: 2021-01-01 | End: 2021-01-01

## 2021-01-01 RX ORDER — ACETAMINOPHEN 325 MG/1
650 TABLET ORAL EVERY 4 HOURS PRN
Status: DISCONTINUED | OUTPATIENT
Start: 2021-01-01 | End: 2021-01-01 | Stop reason: HOSPADM

## 2021-01-01 RX ORDER — CHLORHEXIDINE GLUCONATE 0.12 MG/ML
15 RINSE ORAL 2 TIMES DAILY
Status: DISCONTINUED | OUTPATIENT
Start: 2021-01-01 | End: 2021-01-01 | Stop reason: HOSPADM

## 2021-01-01 RX ORDER — DEXTROSE MONOHYDRATE 25 G/50ML
12.5 INJECTION, SOLUTION INTRAVENOUS PRN
Status: DISCONTINUED | OUTPATIENT
Start: 2021-01-01 | End: 2021-01-01 | Stop reason: HOSPADM

## 2021-01-01 RX ORDER — CALCIUM GLUCONATE 20 MG/ML
2 INJECTION, SOLUTION INTRAVENOUS PRN
Status: DISCONTINUED | OUTPATIENT
Start: 2021-01-01 | End: 2021-01-01 | Stop reason: HOSPADM

## 2021-01-01 RX ORDER — SODIUM POLYSTYRENE SULFONATE 15 G/60ML
15 SUSPENSION ORAL; RECTAL ONCE
Status: DISCONTINUED | OUTPATIENT
Start: 2021-01-01 | End: 2021-01-01 | Stop reason: HOSPADM

## 2021-01-01 RX ADMIN — SODIUM CHLORIDE 6 UNITS/HR: 9 INJECTION, SOLUTION INTRAVENOUS at 10:06

## 2021-01-01 RX ADMIN — EPINEPHRINE 1 MG: 0.1 INJECTION, SOLUTION ENDOTRACHEAL; INTRACARDIAC; INTRAVENOUS at 10:51

## 2021-01-01 RX ADMIN — EPINEPHRINE 1 MG: 0.1 INJECTION, SOLUTION ENDOTRACHEAL; INTRACARDIAC; INTRAVENOUS at 06:25

## 2021-01-01 RX ADMIN — Medication 100 MCG/HR: at 06:43

## 2021-01-01 RX ADMIN — SODIUM CHLORIDE: 9 INJECTION, SOLUTION INTRAVENOUS at 09:00

## 2021-01-01 RX ADMIN — Medication 50 MEQ: at 09:45

## 2021-01-01 RX ADMIN — Medication 1 MG: at 10:21

## 2021-01-01 RX ADMIN — TIROFIBAN 0.07 MCG/KG/MIN: 5 INJECTION, SOLUTION INTRAVENOUS at 11:00

## 2021-01-01 RX ADMIN — DOPAMINE HYDROCHLORIDE 15 MCG/KG/MIN: 160 INJECTION, SOLUTION INTRAVENOUS at 10:28

## 2021-01-01 RX ADMIN — VASOPRESSIN 0.01 UNITS/MIN: 20 INJECTION INTRAVENOUS at 10:37

## 2021-01-01 RX ADMIN — Medication 50 MEQ: at 09:46

## 2021-01-01 RX ADMIN — ROCURONIUM BROMIDE 70 MG: 10 INJECTION INTRAVENOUS at 06:01

## 2021-01-01 RX ADMIN — ETOMIDATE 20 MG: 2 INJECTION, SOLUTION INTRAVENOUS at 06:01

## 2021-01-01 RX ADMIN — AMIODARONE HYDROCHLORIDE 150 MG: 50 INJECTION, SOLUTION INTRAVENOUS at 06:10

## 2021-01-01 RX ADMIN — CALCIUM GLUCONATE 1000 MG: 94 INJECTION, SOLUTION INTRAVENOUS at 09:46

## 2021-01-01 RX ADMIN — EPINEPHRINE 1 MG: 0.1 INJECTION, SOLUTION ENDOTRACHEAL; INTRACARDIAC; INTRAVENOUS at 09:46

## 2021-01-01 RX ADMIN — EPINEPHRINE 1 MG: 0.1 INJECTION, SOLUTION ENDOTRACHEAL; INTRACARDIAC; INTRAVENOUS at 10:54

## 2021-01-01 RX ADMIN — EPINEPHRINE 1 MG: 0.1 INJECTION, SOLUTION ENDOTRACHEAL; INTRACARDIAC; INTRAVENOUS at 06:07

## 2021-01-01 RX ADMIN — Medication 50 MEQ: at 09:49

## 2021-01-01 RX ADMIN — EPINEPHRINE 1 MG: 0.1 INJECTION, SOLUTION ENDOTRACHEAL; INTRACARDIAC; INTRAVENOUS at 11:03

## 2021-01-01 RX ADMIN — EPINEPHRINE 1 MG: 0.1 INJECTION, SOLUTION ENDOTRACHEAL; INTRACARDIAC; INTRAVENOUS at 10:57

## 2021-01-01 RX ADMIN — CALCIUM CHLORIDE 1000 MG: 100 INJECTION, SOLUTION INTRAVENOUS; INTRAVENTRICULAR at 06:01

## 2021-01-01 RX ADMIN — ATROPINE SULFATE 0.5 MG: 0.1 INJECTION PARENTERAL at 05:51

## 2021-01-01 RX ADMIN — EPINEPHRINE 1 MG: 0.1 INJECTION, SOLUTION ENDOTRACHEAL; INTRACARDIAC; INTRAVENOUS at 10:19

## 2021-01-01 RX ADMIN — FUROSEMIDE 40 MG: 10 INJECTION, SOLUTION INTRAVENOUS at 10:04

## 2021-01-01 RX ADMIN — PHENYLEPHRINE HYDROCHLORIDE 100 MCG/MIN: 10 INJECTION INTRAVENOUS at 10:34

## 2021-01-01 RX ADMIN — EPINEPHRINE 1 MG: 0.1 INJECTION, SOLUTION ENDOTRACHEAL; INTRACARDIAC; INTRAVENOUS at 11:06

## 2021-01-01 ASSESSMENT — PULMONARY FUNCTION TESTS: PIF_VALUE: 30

## 2021-02-10 PROBLEM — I46.9 CARDIAC ARREST (HCC): Status: ACTIVE | Noted: 2021-01-01

## 2021-02-10 NOTE — ED NOTES
Patient presents to ED via ems for bradycardia HR: 30's and B in field per ems. Patient was found down on floor by , sick x2 days.      Adelso Bradley, RN  02/10/21 3930

## 2021-02-10 NOTE — PROGRESS NOTES
Patient dropped her blood pressure, had pulseless electrical activity, required CPR, and ACLS directed management for 4 mins atleast, with return of spontaneous circulation on pressors, and will add vasopressin and neosynephrine,  Continue bicarbonate drip, insulin drip, recheck labs. Case discussed with family and poor prognosis discussed in detail.

## 2021-02-10 NOTE — PROGRESS NOTES
PHARMACY CONSULT FOR RENAL DOSING PER DR YUMIKO Arriola    \"renal dose Medications\"    RENAL LAB EVALUATION  Estimated Creatinine Clearance: 15 mL/min (A) (based on SCr of 2.9 mg/dL (H)). Recent Labs     02/10/21  0603   BUN 59*   CREATININE 2.9*     --------------------------------------------------  -reviewed current list of  medications  -no current -medications- need Renal adjusting.     Trina Morris, MS [facility]    2/10/2021  10:13 AM

## 2021-02-10 NOTE — CONSULTS
glucagon (rDNA) injection 1 mg, PRN      dextrose 5 % solution, PRN      insulin regular (HUMULIN R;NOVOLIN R) 100 Units in sodium chloride 0.9 % 100 mL infusion, Continuous      Current Facility-Administered Medications   Medication Dose Route Frequency Provider Last Rate Last Admin    calcium chloride 10 % injection    Daily PRN Karlene Gracia MD   1,000 mg at 02/10/21 0601    EPINEPHrine 1 MG/10ML injection    Daily PRTAM Garcia MD   1 mg at 02/10/21 5343    amiodarone (CORDARONE) injection    Daily PRN Karlene Garcia MD   150 mg at 02/10/21 0610    fentaNYL (SUBLIMAZE) 1,000 mcg in sodium chloride 0.9% 100 mL infusion  100 mcg/hr Intravenous Continuous Karlene Garcia MD 10 mL/hr at 02/10/21 0643 100 mcg/hr at 02/10/21 0643    glucose (GLUTOSE) 40 % oral gel 15 g  15 g Oral PRN Karlene Garcia MD        dextrose 50 % IV solution  12.5 g Intravenous PRN Karlene Garcia MD        glucagon (rDNA) injection 1 mg  1 mg Intramuscular PRN Karlene Garcia MD        dextrose 5 % solution  100 mL/hr Intravenous PRTAM Garcia MD        insulin regular (HUMULIN R;NOVOLIN R) 100 Units in sodium chloride 0.9 % 100 mL infusion  0.1 Units/kg/hr Intravenous Continuous Karlene Garcia MD         Review of Systems:   · Constitutional: No Fever or Weight Loss   · Eyes: No Decreased Vision  · ENT: No Headaches, Hearing Loss or Vertigo  · Cardiovascular: No chest pain, dyspnea on exertion, palpitations or loss of consciousness  · Respiratory: No cough or wheezing    · Gastrointestinal: No abdominal pain, appetite loss, blood in stools, constipation, diarrhea or heartburn  · Genitourinary: No dysuria, trouble voiding, or hematuria  · Musculoskeletal:  No gait disturbance, weakness or joint complaints  · Integumentary: No rash or pruritis  · Neurological: No TIA or stroke symptoms  · Psychiatric: No anxiety or depression  · Endocrine: No malaise, fatigue or temperature intolerance  · Hematologic/Lymphatic: No bleeding problems, blood clots or swollen lymph nodes  · Allergic/Immunologic: No nasal congestion or hives  All systems negative except as marked. ·   ·      Physical Examination:    Vitals:    02/10/21 0706   BP: 114/60   Pulse: 70   Resp: 16   Temp:    SpO2:       Wt Readings from Last 3 Encounters:   02/10/21 132 lb (59.9 kg)   10/22/20 132 lb (59.9 kg)   06/11/18 132 lb (59.9 kg)     Body mass index is 24.94 kg/m². General Appearance:  No distress, conversant    Constitutional:  Well developed, Well nourished, No acute distress, Non-toxic appearance. HENT:  Normocephalic, Atraumatic, Bilateral external ears normal, Oropharynx moist, No oral exudates, Nose normal. Neck- Normal range of motion, No tenderness, Supple, No stridor,no apical-carotid delay, no carotid bruit  Eyes:  PERRL, EOMI, Conjunctiva normal, No discharge. Respiratory:  Normal breath sounds, No respiratory distress, No wheezing, No chest tenderness. ,no use of accessory muscles, diaphragm movement is normal  Cardiovascular: (PMI) apex non displaced,no lifts no thrills, no s3,no s4, Normal heart rate, Normal rhythm, No murmurs, No rubs, No gallops. Carotid arteries pulse and amplitude are normal no bruit, no abdominal bruit noted ( normal abdominal aorta ausculation), femoral arteries pulse and amplitude are normal no bruit, pedal pulses are normal  GI:  Bowel sounds normal, Soft, No tenderness, No masses, No pulsatile masses, no hepatosplenomegally, no bruits  : External genitalia appear normal, No masses or lesions. No discharge. No CVA tenderness. Musculoskeletal:  Intact distal pulses, No edema, No tenderness, No cyanosis, No clubbing. Good range of motion in all major joints. No tenderness to palpation or major deformities noted. Back- No tenderness. Integument:  Warm, Dry, No erythema, No rash. Skin: no rash, no ulcers  Lymphatic:  No lymphadenopathy noted.    Neurologic:  Alert & oriented x 3, Normal motor function, Normal sensory function, No focal deficits noted. Psychiatric:  Affect normal, Judgment normal, Mood normal.   Lab Review   Recent Labs     02/10/21  0603   WBC 5.4   HGB 10.0*   HCT 30.0*         Recent Labs     02/10/21  0603   *   K 5.8*   CL 99   CO2 13*   BUN 59*   CREATININE 2.9*     Recent Labs     02/10/21  0603   *   *   BILITOT 0.4   ALKPHOS 96     No results for input(s): TROPONINI in the last 72 hours. No results found for: BNP  Lab Results   Component Value Date    INR 0.97 02/10/2021    PROTIME 11.7 02/10/2021         EKG:wire complex tachycardia    Chest Xray:pending    ECHO:pending  Labs, echo, meds reviewed  Assessment: 78 y. o.year old with PMH of  has a past medical history of Arthritis, Diabetes mellitus ([facility] [address] History of blood transfusion, Hyperlipidemia, and Hypertension. Recommendations:    1. Cardiac arrest,had PCI of RCA, LAD is diffusely diseased, LCX also has moderate stenosis,temporary pacer is placed and on levophed, will load with effient thru NG tube and aspirin, stat echo ordered and will start on hypothermia protocol, she had aggrastat bolus during PCI also  2. Respiratory failure: on vent, continue full support  3. Hyperkalemia, lactic acidosis and renal failure: calcium gluconate and insulin 10 units ordered, her blood glucose is > 650  4. Hypothermia protocol to start  5. DM: check labs  6. Critical care 45 mins  7. Health maintenance: exerise and diet  All labs, medications and tests reviewed, continue all other medications of all above medical condition listed as is.          Emy Combs MD, 2/10/2021 7:46 AM

## 2021-02-10 NOTE — ED NOTES
Dr. Xiao Kana at bedside to intubate w/ Vonda Mcmullen, Respiratory. 7.5 ETT placed, 23 @ tooth.      Shelli Ocasio RN  02/10/21 4344

## 2021-02-10 NOTE — CONSULTS
Nephrology Service Consultation      [address], [address] [address]  Phone: [phone]  Office Hours: 8:30AM - 4:30PM  Monday - Friday            Patient:  Elsy Hicks  MRN: [id number]  Consulting physician:  Alexx Cortez MD  Reason for Consult: elevated creatinine       PCP: Alexx Cortez MD    HISTORY OF PRESENT ILLNESS:   The patient is a 78 y.o. female with dm2 was found unresponsive and covered with feces and with a HR of 15. She was taken to the cath lab for a temp pacer and also underwent a LHC that showed 100%RCA occlusion and severe LAS dx, stents were placed  Renal consult for cr 2.9, no urine and K of 5.8  She had another cardiac arrest few minutes ago. REVIEW OF SYSTEMS:  Can not obtain due to intubation    Past Medical History:        Diagnosis Date    Arthritis     Diabetes mellitus ([facility] [address]     History of blood transfusion 2018    Hyperlipidemia     Hypertension        Past Surgical History:        Procedure Laterality Date    ABDOMEN SURGERY       section (2)    CARPAL TUNNEL RELEASE Right     COLONOSCOPY      HIP SURGERY Right 2018    ORIF right hip with IM nail    TUBAL LIGATION         Medications:   Prior to Admission medications    Medication Sig Start Date End Date Taking?  Authorizing Provider   gabapentin (NEURONTIN) 300 MG capsule  10/19/20   Historical Provider, MD   lisinopril (PRINIVIL;ZESTRIL) 5 MG tablet TK 1 T PO QD. 19   Historical Provider, MD   ofloxacin (OCUFLOX) 0.3 % solution PLACE 1 GTT INTO OD QID BEGINING AFTER SURGERY 19   Historical Provider, MD   prednisoLONE acetate (PRED FORTE) 1 % ophthalmic suspension PLACE 1 GTT INTO OD QID BEGINING AFTER SURGERY 19   Historical Provider, MD   sertraline (ZOLOFT) 25 MG tablet  20   Historical Provider, MD   rivaroxaban (XARELTO) 10 MG TABS tablet Take 1 tablet by mouth daily 18   STANFORD Parrish MD   insulin glargine (LANTUS) 100 UNIT/ML injection vial Inject 10 Units into the skin nightly 6/12/18   STANFORD Poe MD   insulin lispro (HUMALOG) 100 UNIT/ML injection vial Inject 0-12 Units into the skin 3 times daily (with meals) 6/12/18   STANFORD Poe MD   linagliptin (TRADJENTA) 5 MG tablet Take 1 tablet by mouth daily 6/13/18   STANFORD Poe MD   metFORMIN (GLUCOPHAGE) 1000 MG tablet Take 1 tablet by mouth 2 times daily (with meals) 6/12/18   STANFORD Poe MD   metoprolol tartrate (LOPRESSOR) 25 MG tablet Take 1 tablet by mouth 2 times daily 6/12/18   STANFORD Poe MD   docusate sodium (COLACE, DULCOLAX) 100 MG CAPS Take 100 mg by mouth 2 times daily 6/12/18   STANFORD Poe MD   atorvastatin (LIPITOR) 40 MG tablet Take 40 mg by mouth daily    Historical Provider, MD   aspirin 81 MG tablet Take 81 mg by mouth daily    Historical Provider, MD        Allergies:  Patient has no known allergies. Social History:   TOBACCO:   reports that she has never smoked. She has never used smokeless tobacco.  ETOH:   reports no history of alcohol use. OCCUPATION:      Family History:   No family history on file.         Physical Exam:    Vitals: BP 93/60   Pulse 97   Temp (!) 91.2 °F (32.9 °C) (Bladder)   Resp 16   Ht 5' 1\" (1.549 m)   Wt 132 lb (59.9 kg)   SpO2 100%   BMI 24.94 kg/m²   General appearance: in no acute distress, appears stated age  Skin: Skin color, texture, turgor normal. No rashes or lesions  HEENT: normocephalic, atraumatic, et tube in place  Neck: supple, trachea midline  Lungs:  breathing comfortably on MV  Heart[de-identified] pacer present   Extremities: extremities normal, atraumatic, no cyanosis or edema  Neurologic: sedated  Psychiatric: mood and affect can not be assessed    CBC:   Recent Labs     02/10/21  0603   WBC 5.4   HGB 10.0*        BMP:    Recent Labs     02/10/21  0603   *   K 5.8*   CL 99   CO2 13*   BUN 59*   CREATININE 2.9*   GLUCOSE 642*     Hepatic:   Recent Labs     02/10/21  0603 *   *   BILITOT 0.4   ALKPHOS 96     ABGs:   Lab Results   Component Value Date    PO2ART 101 02/10/2021    NJE4LZS 38.0 02/10/2021         Assessment and Recommendations     Patient Active Problem List    Diagnosis Date Noted    Cardiac arrest ([facility] 02/10/2021    Uncontrolled pain 05/31/2018    Essential hypertension 05/31/2018    Acute blood loss anemia 05/31/2018    Gait disturbance 05/31/2018    Closed right hip fracture, initial encounter ([facility] 05/26/2018    Hyperglycemia 12/08/2011    Uncontrolled type 2 diabetes mellitus with diabetic polyneuropathy, with long-term current use of insulin ([facility] 12/08/2011    Diabetic neuropathy ([facility] 12/08/2011   JAMES likely in ATN  Hyperkalemia  Hyponatremia   Metabolic acidosis  Complete heart block and severe CAD: s/p temp pacer and stents  S/p cardiac arrest  Acute hypoxic resp failure  Hyperglycemia/DKA    Plan:  -Critically ill  -Planning CRRT  -Spoke with daughter Jaylen Hannah about RRT and she was agreeable, she would like us to do anything needed to save he rmom.  I also discussed the need to come see her today since she is very critically ill      Electronically signed by Fermín Argueta DO on 2/10/2021 at 10:11 AM    [address] Mendoza DO [address],  [address]  [place], [place] [address]  PHONE: [phone]  FAX: [phone]

## 2021-02-10 NOTE — PROGRESS NOTES
Patient was found down unresponsive for unknown period of time brought to the emergency department covered in vomitus she was initially resuscitated in the ED with concerns for possible heart block she was externally paced. She was evaluated by cardiology myself and decision was made to brought her to Cath Lab for temporary pacer placement. She underwent cardiac catheterization after placement of temporary pacer when she was brought to the Cath Lab there was no detectable pulse and blood pressure was nonpalpable. She resuscitated with runs of epinephrine and atropine and underwent cardiac catheterization noted to have 100% occluded right coronary artery  She underwent emergent RCA intervention, LAD was also diffusely diseased with multiple tandem lesions. Patient was shocked 3 times due to V. fib arrest while on cath table  Post cath she again coded on the table requiring rounds of epinephrine and atropine. She was also on pressors with Levophed. She was eventually transferred to ICU where initially she was stabilized and started on hypothermia protocol but in spite of being on Levophed 35 mcg her blood pressure dropped again and she required run its of epinephrine during CPR. Lab work suggested ketoacidosis, uncontrolled blood sugar, hyperkalemia with potassium of 5.8  She was constantly paced due to heart block and was noted to be in wide-complex runs  We were able to get palpable blood pressure after epinephrine she was then started on dopamine and Vaughn-Synephrine doses were titrated up.   She coded 3 more times with rounds of CPR  (See separate documentation of CPR codes)  Eventually we were unable to get a palpable pulse in spite of maximum medical therapy and heroic measures  Family was informed multiple times and kept up-to-date

## 2021-02-10 NOTE — LETTER
Heide Barnard was seen visiting the ICU with  Vaughn Alberto while this patient was in critical condition. If you have any questions or concerns, please don't hesitate to call.     Robert Lemus RN    [phone]

## 2021-02-10 NOTE — DISCHARGE SUMMARY
Patient: Zenaida Raman MD      Gender: female  : 1950   Age: 78 y.o. MRN: [id number]    Admitting Physician: Jonna Monzon MD  Discharge Physician: Magda Amor MD     Code Status: Full Code     Admit Date: 2/10/2021   Discharge Date: 02/10/21      Disposition:  Pt passed away                Discharge Diagnoses:Pt passed away  . Active Hospital Problems    Diagnosis    Cardiac arrest ([facility] [I46.9]    Complete heart block ([facility] [I44.2]    JAMES (acute kidney injury) ([facility] [N17.9]    Hyperkalemia [N40.4]    Metabolic acidosis [A88.8]     DM II uncontrolled with  DKA        History of Present Illness:  Renard Wylie is a 78 y.o. female with   has a past medical history of Arthritis, Diabetes mellitus  uncontrolled with A1c 13.2  ([facility] History of blood transfusion, Hyperlipidemia, and Hypertension. who brought to ER after she was found by her family unresponsive  On the flooe and according to chart and ER staff pt had complete heart block and se was resuscitated and rushed to cath lab and arrangment done for ICU admission    History obtained from patient . Hospital Course: Pt passed away    Admitted to [address] [place] [address] after she was coded and had [facility]   Cardiology , nephrology, pulmo, endo consult   Tele   IVF  Home meds , reviewed and resumed as appropriate   Symptoms releif/Pain control  DVT proph       Consults.   IP CONSULT TO CARDIOLOGY  IP CONSULT TO INTERNAL MEDICINE  IP CONSULT TO ENDOCRINOLOGY  IP CONSULT TO PULMONOLOGY  IP CONSULT TO NEPHROLOGY  IP CONSULT TO INTERVENTIONAL RADIOLOGY  IP CONSULT TO INTERVENTIONAL RADIOLOGY  IP CONSULT TO PHARMACY          Signed:    Magda Amor MD   2/10/2021

## 2021-02-10 NOTE — PROGRESS NOTES
Pt intubated with 7.5 ETT, good color change, tube currently at 20 @lip after pulling back from 22 per xray.

## 2021-02-10 NOTE — ED PROVIDER NOTES
Triage Chief Complaint:   Cardiac Arrest    [place]:  Azra Hanley is a 78 y.o. female that presents from home via EMS after she was found unresponsive in her hallway with vomitus and stool. Unknown last known normal.  EMS reports the patient has been hypotensive and bradycardic. They report that the patient is an insulin dependent diabetic and was hyperglycemic. ROS:  Unable to obtain. Past Medical History:   Diagnosis Date    Arthritis     Diabetes mellitus ([facility]     History of blood transfusion 2018    Hyperlipidemia     Hypertension      Past Surgical History:   Procedure Laterality Date    ABDOMEN SURGERY       section (2)    CARPAL TUNNEL RELEASE Right     COLONOSCOPY      HIP SURGERY Right 2018    ORIF right hip with IM nail    TUBAL LIGATION       No family history on file.   Social History     Socioeconomic History    Marital status: Single     Spouse name: Not on file    Number of children: Not on file    Years of education: Not on file    Highest education level: Not on file   Occupational History    Not on file   Social Needs    Financial resource strain: Not on file    Food insecurity     Worry: Not on file     Inability: Not on file    Transportation needs     Medical: Not on file     Non-medical: Not on file   Tobacco Use    Smoking status: Never Smoker    Smokeless tobacco: Never Used   Substance and Sexual Activity    Alcohol use: No    Drug use: No    Sexual activity: Not Currently     Partners: Male   Lifestyle    Physical activity     Days per week: Not on file     Minutes per session: Not on file    Stress: Not on file   Relationships    Social connections     Talks on phone: Not on file     Gets together: Not on file     Attends [employer] service: Not on file     Active member of club or organization: Not on file     Attends meetings of clubs or organizations: Not on file     Relationship status: Not on file    Intimate partner violence Fear of current or ex partner: Not on file     Emotionally abused: Not on file     Physically abused: Not on file     Forced sexual activity: Not on file   Other Topics Concern    Not on file   Social History Narrative    Not on file     Current Facility-Administered Medications   Medication Dose Route Frequency Provider Last Rate Last Admin    calcium chloride 10 % injection    Daily PRN Scot Puga MD   1,000 mg at 02/10/21 0601    EPINEPHrine 1 MG/10ML injection    Daily PRN Scot Puga MD   1 mg at 02/10/21 6000    amiodarone (CORDARONE) injection    Daily PRN Scot Puga MD   150 mg at 02/10/21 0610    fentaNYL (SUBLIMAZE) 1,000 mcg in sodium chloride 0.9% 100 mL infusion  100 mcg/hr Intravenous Continuous Scot Puga MD 10 mL/hr at 02/10/21 0643 100 mcg/hr at 02/10/21 0643    glucose (GLUTOSE) 40 % oral gel 15 g  15 g Oral PRN Scot Puga MD        dextrose 50 % IV solution  12.5 g Intravenous PRN Scot Puga MD        glucagon (rDNA) injection 1 mg  1 mg Intramuscular PRN Scot Puga MD        dextrose 5 % solution  100 mL/hr Intravenous PRN Scot Puga MD        insulin regular (HUMULIN R;NOVOLIN R) 100 Units in sodium chloride 0.9 % 100 mL infusion  0.1 Units/kg/hr Intravenous Continuous Scot Puga MD         No Known Allergies    Nursing Notes Reviewed    Physical Exam:  ED Triage Vitals   Enc Vitals Group      BP 02/10/21 0552 (!) 52/30      Pulse 02/10/21 0552 71      Resp 02/10/21 0617 14      Temp --       Temp src --       SpO2 --       Weight --       Height --       Head Circumference --       Peak Flow --       Pain Score --       Pain Loc --       Pain Edu? --       Excl. in [address]? --      GENERAL APPEARANCE: Obtunded. GCS 3  HEAD: Normocephalic. Atraumatic. EYES: Pulls equal and reactive. ENT: Mucous membranes are tacky  NECK: Supple. Trachea midline. HEART: Bradycardic. Peripheral pulses not palpable  LUNGS: Respirations unlabored. Diminished bilaterally  ABDOMEN: Soft.  Non distended. EXTREMITIES: No acute deformities. SKIN: Warm and dry. NEUROLOGICAL: No gross facial drooping.  GCS 3    I have reviewed and interpreted all of the currently available lab results from this visit (if applicable):  Results for orders placed or performed during the hospital encounter of 02/10/21   CBC Auto Differential   Result Value Ref Range    WBC 5.4 4.0 - 10.5 K/CU MM    RBC 3.56 (L) 4.2 - 5.4 M/CU MM    Hemoglobin 10.0 (L) 12.5 - 16.0 GM/DL    Hematocrit 30.0 (L) 37 - 47 %    MCV 84.3 78 - 100 FL    MCH 28.1 27 - 31 PG    MCHC 33.3 32.0 - 36.0 %    RDW 13.9 11.7 - 14.9 %    Platelets 774 974 - 321 K/CU MM    MPV 11.6 (H) 7.5 - 11.1 FL    Differential Type AUTOMATED DIFFERENTIAL     Segs Relative 38.7 36 - 66 %    Lymphocytes % 55.9 (H) 24 - 44 %    Monocytes % 4.4 (H) 0 - 4 %    Eosinophils % 0.4 0 - 3 %    Basophils % 0.2 0 - 1 %    Segs Absolute 2.1 K/CU MM    Lymphocytes Absolute 3.0 K/CU MM    Monocytes Absolute 0.2 K/CU MM    Eosinophils Absolute 0.0 K/CU MM    Basophils Absolute 0.0 K/CU MM    Nucleated RBC % 0.0 %    Total Nucleated RBC 0.0 K/CU MM    Total Immature Neutrophil 0.02 K/CU MM    Immature Neutrophil % 0.4 0 - 0.43 %   CMP   Result Value Ref Range    Sodium 128 (L) 135 - 145 MMOL/L    Potassium 5.8 (HH) 3.5 - 5.1 MMOL/L    Chloride 99 99 - 110 mMol/L    CO2 13 (L) 21 - 32 MMOL/L    BUN 59 (H) 6 - 23 MG/DL    CREATININE 2.9 (H) 0.6 - 1.1 MG/DL    Glucose 642 (HH) 70 - 99 MG/DL    Calcium 8.0 (L) 8.3 - 10.6 MG/DL    Albumin 3.1 (L) 3.4 - 5.0 GM/DL    Total Protein 5.7 (L) 6.4 - 8.2 GM/DL    Total Bilirubin 0.4 0.0 - 1.0 MG/DL     (H) 10 - 40 U/L     (H) 15 - 37 IU/L    Alkaline Phosphatase 96 40 - 128 IU/L    GFR Non- 16 (L) >60 mL/min/1.73m2    GFR  20 (L) >60 mL/min/1.73m2    Anion Gap 16 4 - 16   Troponin   Result Value Ref Range    Troponin T 0.058 (H) <0.01 NG/ML   PT/INR   Result Value Ref Range    Protime 11.7 11.7 - 14.5 SECONDS INR 0.97 INDEX   TSH without Reflex   Result Value Ref Range    TSH, High Sensitivity 6.540 (H) 0.270 - 4.20 uIu/ml   Magnesium   Result Value Ref Range    Magnesium 2.2 1.8 - 2.4 mg/dl   Brain Natriuretic Peptide   Result Value Ref Range    Pro-BNP 1,949 (H) <300 PG/ML   Beta-Hydroxybutyrate   Result Value Ref Range    Beta-Hydroxybutyrate 1.5 0.0 - 3.0 MG/DL   EKG 12 Lead   Result Value Ref Range    Ventricular Rate 109 BPM    Atrial Rate 122 BPM    QRS Duration 140 ms    Q-T Interval 376 ms    QTc Calculation (Bazett) 506 ms    R Axis 100 degrees    T Axis -61 degrees    Diagnosis       Wide QRS rhythm  Rightward axis  Nonspecific intraventricular block  Inferior infarct , age undetermined  Abnormal ECG  When compared with ECG of 10-FEB-2021 05:49,  Previous ECG has undetermined rhythm, needs review        Radiographs (if obtained):  [] The following radiograph was interpreted by myself in the absence of a radiologist:  [x] Radiologist's Report Reviewed:    EKG (if obtained): (All EKG's are interpreted by myself in the absence of a cardiologist)    MDM:  Plan of care is discussed thoroughly with the patient and family if present. If performed, all imaging and lab work also discussed with patient. All relevant prior results and chart reviewed if available. Patient presents as above. She is completely unresponsive on arrival, bradycardic and appears to be in complete heart block on the monitor. Patient is hypotensive. Transcutaneous pacing immediately initiated along with 0.5 mg of atropine. Good capture was obtained with transcutaneous pacing and patient's blood pressure did improve into the 62Q systolic. She was intubated emergently for airway protection. Patient had also been given calcium gluconate. Following intubation, patient became bradycardic and lost pulses. Chest compressions initiated with epinephrine given.   After 1 round of compressions, patient had return of spontaneous circulation. She was noted to be in ventricular tachycardia with a rate in the 130s 140s. She was given 150 mg of amiodarone. This did slow her heart rate to about 100 and appear to be in a sinus or junctional rhythm at that time. Patient then had heart rate dropped into the 30s and 40s requiring again transcutaneous pacing. I spoke with Dr. Christian Huston of cardiology for emergent consultation. Currently awaiting electrolytes on lab work. Patient taken to CT for evaluation of any acute intracranial abnormality that may have caused patient's presentation. I spoke with the patient's daughter who is present and she stated that the patient was found this morning unresponsive by her stepdad in the hallway. She had vomited and defecated on herself. Unknown last known normal at this time. Dr. Gillian De La Paz arrived to room and will take patient for emergent temporary pacemaker and possible cardiac cath. Patient did remain hemodynamically stable on transcutaneous pacing with good capture. Metabolic work-up significant for hyperglycemia with low bicarb and potassium of 5.8. She does not have an elevated anion gap. I am suspicious of either recent DKA or developing DKA. She has insulin-dependent and started on insulin infusion. Procedure Note - Intubation:  Emergent procedure. Pre-oxygenation was administered using non rebreather and nasal cannula and the appropriate equipment and staff were made available at the bedside. Anahi Fields was sedated/paralyzed; please see the chart for the drugs and dosages administered. A Sheyla 4 laryngoscope blade was used for a grade 0 view and a 7.5mm endotracheal tube was viewed to pass through the cords on the first attempt. The tube was secured at 23cm to the maxillary teeth. Tracheal position was confirmed using a colorimetric end-tidal CO2 detector, tube condensation and chest auscultation/visualization. ETT depth confirmed with portable chest x-ray.  Respiratory therapy is at the bedside and is assisting with ventilatory management. I directly delivered medical care to this critically ill patient. Timely evaluation and treatment was necessary to address the significant organ system dysfunction present in this patient. Due to high probability of clinically significant, life-threatening deterioration, the patient required my highest level of preparedness to intervene emergently and I personally spent this critical care time directly and personally managing the patient. I was involved in the stabilization of this critical patient for 75 minutes. During this time I was physically present at the bedside during my initial exam and for re-examinations at intervals coordinating this patient's care with other physicians, examining radiographs, interpreting electrocardiograms and rhythm strips, reviewing laboratory results, discussing the patient's condition and management with the patient/family. Time billed does not include time for procedures. Clinical Impression:  1. Complete heart block ([address]    2. Acute respiratory failure, unspecified whether with hypoxia or hypercapnia ([address]    3.  Hyperkalemia      (Please note that portions of this note may have been completed with a voice recognition program. Efforts were made to edit the dictations but occasionally words are mis-transcribed.)    MD Guido Huerta MD  02/10/21 4908

## 2021-02-10 NOTE — ED NOTES
Patient paced w/ Zoll monitor @ 70mA. Increased to 94mA @ 0626.      Khloe Diana RN  02/10/21 323 [facility], RN  02/10/21 3917

## 2021-02-10 NOTE — PROGRESS NOTES
Patient seen in emergency department due to cardiac arrest she was found on responsive down resuscitated after CPR in the emergency department was noted to be bradycardic.  When I evaluated her in the ED she was being externally paced underlying rhythm is complete heart block  She was brought emergently to Cath Lab for placement of temporary pacer  We were able to get blood pressures requiring some pressors  Cardiac cath completed showing acute occlusion of right coronary artery as well  LAD is also diffusely diseased  Patient underwent CPR and resuscitation with pressor support post procedure  Critical care time spent 45 minutes

## 2021-02-10 NOTE — ED NOTES
[facility] [employer] (daughter) [phone]  Rudi Gomes () [phone]     Joanna Tineo  02/10/21 5498

## 2021-02-10 NOTE — PROGRESS NOTES
Code was called by MD per family request. Manual bagging ventilation stopped and ventilator has been shut off.

## 2021-02-10 NOTE — CONSULTS
Endocrinology   Consult Note      Dear Doctor   Simran Ocampo for the Consult     Pt. Was Admitted for : Cardiac arrest and CPR done x2 and patient was placed on Arctic pump    Reason for Consult: Better control of blood glucose      History Obtained From:  Patient/ EMR       HISTORY OF PRESENT ILLNESS:                The patient is a 78 y.o. female with significant past medical history of diabetes mellitus, hypertension, hyperlipidemia was brought into the emergency room when she was found unconscious and unresponsive. we will score reachers she was found to have vomitus around her she was resuscitated initially with with emergency room with a possibly heart block and she was placed externally. I reviewed the notes from cardiology she was externally paced and there is no detected pulse blood pressures are palpable she was resuscitated and underwent emergency cardiac cath she found to have 100% occluded right coronary artery. Also had LAD diffuse disease however RCA was stented. Patient had went into V. fib and she was shocked 3 different times in the cardiac Cath Lab and coded afterwards eventually she was transferred to [facility] ICU and was placed on Arctic pump and various pressors agents to maintain the blood pressure her blood sugar was running high she was started on insulin insulin drip. I was consulted for better control of her blood glucose. I was  consulted for better control of blood glucose. ROS:   Pt's ROS done in detail. Abnormal ROS are noted in Medical and Surgical History Section below:      Other Medical History:        Diagnosis Date    Arthritis     Diabetes mellitus ([address]     History of blood transfusion 2018    Hyperlipidemia     Hypertension      Surgical History:        Procedure Laterality Date    ABDOMEN SURGERY       section (2)    CARPAL TUNNEL RELEASE Right     COLONOSCOPY      HIP SURGERY Right 2018    ORIF right hip with IM nail    TUBAL LIGATION         Allergies:  Patient has no known allergies. Family History:   No family history on file. REVIEW OF SYSTEMS:  Review of System Done as noted above     PHYSICAL EXAM:      Vitals:    BP (!) 0/0   Pulse (!) 0   Temp (!) 91.2 °F (32.9 °C) (Bladder)   Resp 16 Comment: Ventilated  Ht 5' 1\" (1.549 m)   Wt 132 lb (59.9 kg)   SpO2 (!) 21%   BMI 24.94 kg/m²     CONSTITUTIONAL: Patient is sedated intubated and on ventilator also on Arctic pump  EYES:  vision intact Fundoscopic Exam not performed   ENT:Normal  NECK:  Supple, No JVD. Thyroid Exam:Normal   LUNGS:  Has Vesicular Breath Sounds, intubated and on ventilator CARDIOVASCULAR:  Normal apical impulse, regular rate and rhythm, normal S1 and S2, no S3 or S4, and has no  murmur   ABDOMEN:  No scars, normal bowel sounds, soft, non-distended, non-tender, no masses palpated, no hepatolienomegaly  Musculoskeletal: Normal  Extremities: Normal, peripheral pulses normal, , has no edema   NEUROLOGIC: Patient is sedated intubated and on ventilator also on Arctic pump    DATA:    CBC:   Recent Labs     02/10/21  0603   WBC 5.4   HGB 10.0*       CMP:  Recent Labs     02/10/21  0603 02/10/21  0950   * 177*   K 5.8* 3.5   CL 99 108   CO2 13* >50*   BUN 59* 44*   CREATININE 2.9* 1.9*   CALCIUM 8.0* 9.6   PROT 5.7*  --    LABALBU 3.1*  --    BILITOT 0.4  --    ALKPHOS 96  --    *  --    *  --      Lipids:   Lab Results   Component Value Date    CHOL 339 07/30/2020    HDL 49 07/30/2020    TRIG 271 07/30/2020     Glucose: No results for input(s): POCGLU in the last 72 hours.   Hemoglobin A1C:   Lab Results   Component Value Date    LABA1C 15.1 07/30/2020     Free T4:   Lab Results   Component Value Date    T4FREE 0.94 07/30/2020     Free T3:   Lab Results   Component Value Date    FT3 3.2 04/20/2016     TSH High Sensitivity:   Lab Results   Component Value Date    TSHHS 6.540 02/10/2021       Echocardiogram Limited    Result Date: evidence of any pericardial effusion. Miscellaneous  Positive bubble study. PFO suspected. Temporary pacer wire visualized within the IVC and right heart. M-Mode/2D Measurements & Calculations   LV Diastolic Dimension: 5.70 cm LV Systolic Dimension: 7.36 cm  LV FS:15 %                      LV Volume Diastolic: 49 ml  LV PW Diastolic: 1.4 cm         LV Volume Systolic: 38 ml  Septum Diastolic: 7.12 cm       LV EDV/LV EDV Index: 49 ml/31 m2LV ESV/LV                                  ESV Index: 38 ml/24 m2                                  EF Calculated (A4C): 22.5 %                                  EF Calculated (2D): 31.9 %  LV Area Diastolic: 60.5 cm2  LV Area Systolic: 36.0 cm2      LV Length: 7.06 cm  Doppler Measurements & Calculations                              Estimated RVSP: 11 mmHg                             Estimated RAP:3 mmHg    Estimated PASP: 9.76 mmHg TR Velocity:130 cm/s                             TR Gradient:6.76 mmHg      Ct Head Wo Contrast    Result Date: 2/10/2021  EXAMINATION: CT OF THE HEAD WITHOUT CONTRAST 2/10/2021 6:40 am TECHNIQUE: CT of the head was performed without the administration of intravenous contrast. Dose modulation, iterative reconstruction, and/or weight based adjustment of the mA/kV was utilized to reduce the radiation dose to as low as reasonably achievable. COMPARISON: Head CT 02/27/2017 HISTORY: ORDERING SYSTEM PROVIDED HISTORY: ams TECHNOLOGIST PROVIDED HISTORY: Reason for exam:->ams Has a \"code stroke\" or \"stroke alert\" been called? ->No Decision Support Exception->Emergency Medical Condition (MA) Reason for Exam: ams/cardiac arrest Acuity: Acute Type of Exam: Initial Additional signs and symptoms: found down at home Relevant Medical/Surgical History: pt on vent FINDINGS: BRAIN/VENTRICLES:  No masses nor acute intracranial hemorrhage. Intact gray/white matter differentiation without findings of acute ischemia. No mass effect nor midline shift.   Patent basilar cisterns and foramen magnum. No hydrocephalus. Mild diffuse atrophy. Minimal deep and periventricular white matter hypodensities likely due to chronic small vessel ischemia. Physiologic bilateral basal ganglia calcifications. Partially empty sella turcica. ORBITS:  New bilateral lens implants. Slight gaze divergence. Mild bilateral proptosis. SINUSES:  Hypoplastic left frontal sinus. Minimal mucoperiosteal thickening in the bilateral ethmoid and bilateral maxillary sinuses. Partial opacification of the left mastoid air cells likely due to effusion. SOFT TISSUES/SKULL:  No acute soft tissue abnormality. Mild atherosclerotic calcifications. No acute fracture. No acute intracranial abnormality. Xr Chest Portable    Result Date: 2/10/2021  EXAMINATION: ONE XRAY VIEW OF THE CHEST 2/10/2021 6:56 am COMPARISON: Chest radiograph 05/26/2018 HISTORY: ORDERING SYSTEM PROVIDED HISTORY: post intubation TECHNOLOGIST PROVIDED HISTORY: Reason for exam:->post intubation Reason for Exam: post intubation, ng placement Acuity: Acute Type of Exam: Initial FINDINGS: Endotracheal tube terminating approximately 4.4 cm above the cristina at the level of the medial clavicles and great vessel origins. Partially included gastric tube with the tip and sideport not included but extending at least into the gastric body below the gastroesophageal junction. Overlying heart monitor leads. Persistent slight eventration or elevation of the right hemidiaphragm. Low lung volumes. Left infrahilar airspace opacity, likely retrocardiac in location. New linear opacity in the lateral left lung base likely due to subsegmental atelectasis. Otherwise clear lungs. No definite findings of pneumothorax or pleural effusion. Normal mediastinal, hilar, and cardiac contours. Atherosclerotic calcification in the aorta. No acute fracture. 1. Endotracheal tube in appropriate position as above.   Of note, a partially included gastric tube is also considered appropriately positioned despite incomplete inclusion. 2. Left lower lobe airspace opacity potentially due to atelectasis, pneumonia, or aspiration. Adjacent subsegmental atelectasis.        Scheduled Medicines   Medications:    atorvastatin  40 mg Oral Daily    insulin glargine  10 Units Subcutaneous Nightly    metoprolol tartrate  25 mg Oral BID    prednisoLONE acetate  1 drop Both Eyes 4 times per day    heparin (porcine)  5,000 Units Subcutaneous 3 times per day    insulin lispro  0-12 Units Subcutaneous TID WC    insulin lispro  0-6 Units Subcutaneous Nightly    sodium chloride flush  10 mL Intravenous 2 times per day    [START ON 2/11/2021] prasugrel  10 mg Oral Daily    [START ON 2/11/2021] aspirin  81 mg Oral Daily    tirofiban  25 mcg/kg Intravenous Once    chlorhexidine  15 mL Mouth/Throat BID    aspirin  300 mg Rectal Daily    albuterol sulfate HFA  4 puff Inhalation 4x Daily    ipratropium  4 puff Inhalation 4x daily    sodium polystyrene  15 g Oral Once    sodium bicarbonate  50 mEq Intravenous Once    lidocaine PF  5 mL Intradermal Once    sodium chloride flush  10 mL Intravenous 2 times per day    cefTRIAXone (ROCEPHIN) IV  1,000 mg Intravenous Q24H    calcium gluconate-NaCl  1,000 mg Intravenous Once      Infusions:    fentaNYL 100 mcg/hr (02/10/21 0643)    dextrose      insulin 6 Units/hr (02/10/21 1006)    dextrose      sodium chloride 75 mL/hr at 02/10/21 0900    tirofiban Stopped (02/10/21 1110)    DOPamine 15 mcg/kg/min (02/10/21 1028)    IV infusion builder     Laura Palencia BGK 4/2.5      prismaSATE BGK 4/2.5      prismaSATE BGK 4/2.5      DOPamine      phenylephrine (SALOMON-SYNEPHRINE) 50mg/250mL infusion 100 mcg/min (02/10/21 1034)    vasopressin (Septic Shock) infusion Stopped (02/10/21 1110)         IMPRESSION    Patient Active Problem List   Diagnosis    Hyperglycemia    Uncontrolled type 2 diabetes mellitus with diabetic polyneuropathy, with long-term current use of insulin (HCC)    Diabetic neuropathy ([facility]    Closed right hip fracture, initial encounter ([facility]    Uncontrolled pain    Essential hypertension    Acute blood loss anemia    Gait disturbance    Cardiac arrest ([facility]    Complete heart block (HCC)    JAMES (acute kidney injury) ([facility]    Hyperkalemia    Metabolic acidosis         RECOMMENDATIONS:      1. Reviewed POC blood glucose . Labs and X ray results   2. Reviewed Home and Current Medicines   3. Will Start On IV insulin infusion drip  4. Monitor Blood glucose frequently   5. Modify  the dose of Insulin  as needed        Will follow with you  Again thank you for sharing pt's care with me.      Truly yours,       Aleksandra Matthew MD

## 2021-02-10 NOTE — CONSULTS
[address] [place] [address], [address] [facility]                                  CONSULTATION    PATIENT NAME: Jade Irizarry                 :        1950  MED REC NO:   [id number]                          ROOM:       2119  ACCOUNT NO:   [de-identified]                           ADMIT DATE: 02/10/2021  PROVIDER:     Deyvi Champagne MD    CONSULT DATE:  02/10/2021    HISTORY OF PRESENT ILLNESS:  The patient is a 71-year-old lady with  history of diabetes, hypertension, arthritis, who  was found  unresponsive at home covered with feces. She had heart rate of 15. She  was brought to the emergency room. She was intubated. She was taken to  the cath lab and had complete RCA occlusion and severe disease in the  LAD. The patient underwent coronary intervention and was subsequently  admitted to the intensive care unit. The patient has had three cardiac  arrests, one in the cath lab, one in the emergency room, and one in ICU. There is no history of hemoptysis, hematonosis, nausea, or vomiting. No  history of fever or chills. PAST MEDICAL HISTORY:  Significant for diabetes, hyperlipidemia,  arthritis, and hypertension. PAST SURGICAL HISTORY:  Remarkable for tubal ligation, carpal tunnel  release surgery, colonoscopy, hip surgery, and abdominal surgery. FAMILY HISTORY:  Noncontributory. SOCIAL HISTORY:  Not available. MEDICATIONS:  Reviewed. ALLERGIES:  She has no known allergies. REVIEW OF SYSTEMS:  Unobtainable at this time. PHYSICAL EXAMINATION:  GENERAL:  The patient is unresponsive on the ventilator, in no acute  respiratory distress. VITAL SIGNS:  Her blood pressure is 93/60 mmHg, pulse of 97 per minute,  and respiratory rate of 16 per minute. Her temperature is 91.2 degrees  Fahrenheit. She is on hypothermia protocol. Her saturation is 100% on  the monitor. HEENT:  Reveals presence of orotracheal tube. NECK:  There is no JVD. No lymphadenopathy. The neck is supple. LUNGS:  Revealed diminished breath sounds and basilar crackles. HEART:  Showed normal S1 and S2. There was no S3 or S4 noted. ABDOMEN:  Benign. There is no evidence of any organomegaly. The bowel  sounds are present. NEUROLOGIC:  Reveals that she is unresponsive. LABORATORY DATA:  Her chest x-ray showed left lower lobe infiltrate  versus atelectasis. ET tube 4.4 cm above the cristina. Her proBNP was  1949. Her CT of the head was unremarkable. Her electrolytes showed a  sodium of 128, potassium 5.8, chloride 99, carbon dioxide 13, BUN 59,  creatinine 2.9. Her glucose was 642. Her CBC showed a white count of  5.4, hemoglobin 10.1, and hematocrit of 30.0. IMPRESSION:  1. Acute respiratory failure, secondary to status post cardiac arrest.  2.  Status post cardiac arrest.  3.  Severe coronary artery disease, status post coronary intervention. 4.  Possible left basilar pneumonia. PLAN:  1. Continue full ventilator support. 2.  Nephrology has been consulted for possible dialysis. 3.  IV Rocephin. 4.  Combivent four puffs every four hours. 5.  Sputum for culture and sensitivity. 6.  Electrolyte management as per Nephrology. 7.  Check ABGs. 8.  As per orders.         Deepali Woods MD    D: 02/10/2021 10:32:21       T: 02/10/2021 11:06:12     [email or web address]  Job#: [id number]     Doc#: [id number]    CC:

## 2021-02-10 NOTE — PROGRESS NOTES
Over riding pacer at 92.    Gases checked   Ph 7.160  Pco2 49.6  po2 53.8  chco3 17.7    Pressors are now maxed, per Dr request.  Pt coding again at [phone]

## 2021-02-10 NOTE — CONSULTS
CARDIOLOGY CONSULT NOTE   Reason for consultation:  Cardiac arrest     Referring physician:  Emmie Wilson MD     Primary care physician: Veronique Beltran MD      Dear  Dr. Emmie Wilson MD   Thanks for the consult. Chief Complaints :  Chief Complaint   Patient presents with    Cardiac Arrest        History of present illness:Gina is a 78 y. o.year old who was brought in to ed after being found non responsive . She had vomitus on her, not sure how long she was down,. INE D she was was in heart block and had CPR with ROSC    Past medical history:    has a past medical history of Arthritis, Diabetes mellitus ([address] History of blood transfusion, Hyperlipidemia, and Hypertension. Past surgical history:   has a past surgical history that includes Tubal ligation; Carpal tunnel release (Right); Colonoscopy; hip surgery (Right, 05/28/2018); and Abdomen surgery. Social History:   reports that she has never smoked. She has never used smokeless tobacco. She reports that she does not drink alcohol or use drugs.   Family history:   no family history of CAD, STROKE of DM at early age    No Known Allergies        fentaNYL (SUBLIMAZE) 1,000 mcg in sodium chloride 0.9% 100 mL infusion, Continuous      glucose (GLUTOSE) 40 % oral gel 15 g, PRN      dextrose 50 % IV solution, PRN      dextrose 5 % solution, PRN      insulin regular (HUMULIN R;NOVOLIN R) 100 Units in sodium chloride 0.9 % 100 mL infusion, Continuous      atorvastatin (LIPITOR) tablet 40 mg, Daily      insulin glargine (LANTUS) injection vial 10 Units, Nightly      metoprolol tartrate (LOPRESSOR) tablet 25 mg, BID      prednisoLONE acetate (PRED FORTE) 1 % ophthalmic suspension 1 drop, 4 times per day      glucagon (rDNA) injection 1 mg, PRN      dextrose 5 % solution, PRN      promethazine (PHENERGAN) tablet 12.5 mg, Q6H PRN    Or      ondansetron (ZOFRAN) injection 4 mg, Q6H PRN      potassium chloride (KLOR-CON M) extended release tablet 40 mEq, PRN    Or      potassium bicarb-citric acid (EFFER-K) effervescent tablet 40 mEq, PRN    Or      potassium chloride 10 mEq/100 mL IVPB (Peripheral Line), PRN      magnesium sulfate 2000 mg in 50 mL IVPB premix, PRN      0.9 % sodium chloride infusion, Continuous      heparin (porcine) injection 5,000 Units, 3 times per day      insulin lispro (HUMALOG) injection vial 0-12 Units, TID WC      insulin lispro (HUMALOG) injection vial 0-6 Units, Nightly      sodium chloride flush 0.9 % injection 10 mL, 2 times per day      sodium chloride flush 0.9 % injection 10 mL, PRN      acetaminophen (TYLENOL) tablet 650 mg, Q4H PRN      [START ON 2/11/2021] prasugrel (EFFIENT) tablet 10 mg, Daily      [START ON 2/11/2021] aspirin EC tablet 81 mg, Daily      tirofiban (AGGRASTAT) IV bolus 1,497.5 mcg, Once      tirofiban (AGGRASTAT) 50 mcg/mL infusion, Continuous      chlorhexidine (PERIDEX) 0.12 % solution 15 mL, BID      aspirin suppository 300 mg, Daily      ipratropium-albuterol (DUONEB) nebulizer solution 1 ampule, 4x Daily PRN      albuterol sulfate  (90 Base) MCG/ACT inhaler 4 puff, 4x Daily      ipratropium (ATROVENT HFA) 17 MCG/ACT inhaler 4 puff, 4x daily      DOPamine (INTROPIN) 400 mg in dextrose 5 % 250 mL infusion, Continuous      sodium polystyrene (KAYEXALATE) 15 GM/60ML suspension 15 g, Once      sodium bicarbonate 8.4 % injection 50 mEq, Once      lidocaine PF 1 % injection 5 mL, Once      sodium chloride flush 0.9 % injection 10 mL, 2 times per day      sodium chloride flush 0.9 % injection 10 mL, PRN      sodium bicarbonate 150 mEq in sterile water 1,000 mL infusion, Continuous      prismaSATE BGK 4/2.5 dialysis solution, Continuous      prismaSATE BGK 4/2.5 dialysis solution, Continuous      prismaSATE BGK 4/2.5 dialysis solution, Continuous      potassium chloride 20 mEq/50 mL IVPB (Central Line), PRN      magnesium sulfate 1000 mg in dextrose 5% 100 mL IVPB, PRN      calcium gluconate 1000 mg in sodium chloride 50 mL, PRN    Or      calcium gluconate 2000 mg in sodium chloride 100 mL, PRN    Or      calcium gluconate 3,000 mg in dextrose 5 % 100 mL IVPB, PRN    Or      calcium gluconate 4,000 mg in dextrose 5 % 100 mL IVPB, PRN      sodium phosphate 6 mmol in dextrose 5 % 250 mL IVPB, PRN    Or      sodium phosphate 12 mmol in dextrose 5 % 250 mL IVPB, PRN    Or      sodium phosphate 18 mmol in dextrose 5 % 500 mL IVPB, PRN    Or      sodium phosphate 24 mmol in dextrose 5 % 500 mL IVPB, PRN      0.9 % sodium chloride bolus, PRN      DOPamine (INTROPIN) 1.6-5 MG/ML-% infusion,       phenylephrine (SALOMON-SYNEPHRINE) 50 mg in dextrose 5 % 250 mL infusion, Continuous      cefTRIAXone (ROCEPHIN) 1000 mg IVPB in 50 mL D5W minibag, Q24H      vasopressin 20 Units in sodium chloride 0.9 % 100 mL infusion, Continuous      calcium gluconate 1000 mg in sodium chloride 50 mL, Once      Current Facility-Administered Medications   Medication Dose Route Frequency Provider Last Rate Last Admin    fentaNYL (SUBLIMAZE) 1,000 mcg in sodium chloride 0.9% 100 mL infusion  100 mcg/hr Intravenous Continuous Isrrael Syed MD 10 mL/hr at 02/10/21 0643 100 mcg/hr at 02/10/21 0643    glucose (GLUTOSE) 40 % oral gel 15 g  15 g Oral PRN Isrrael Syed MD        dextrose 50 % IV solution  12.5 g Intravenous PRN Isrrael Syed MD        dextrose 5 % solution  100 mL/hr Intravenous PRN Isrrael Syed MD        insulin regular (HUMULIN R;NOVOLIN R) 100 Units in sodium chloride 0.9 % 100 mL infusion  0.1 Units/kg/hr Intravenous Continuous Akhil Byrd MD 6 mL/hr at 02/10/21 1006 6 Units/hr at 02/10/21 1006    atorvastatin (LIPITOR) tablet 40 mg  40 mg Oral Daily Akhil Byrd MD        insulin glargine (LANTUS) injection vial 10 Units  10 Units Subcutaneous Nightly Akhil Byrd MD        metoprolol tartrate (LOPRESSOR) tablet 25 mg  25 mg Oral BID Paris Lujan MD Stopped at 02/10/21 1056    prednisoLONE acetate (PRED FORTE) 1 % ophthalmic suspension 1 drop  1 drop Both Eyes 4 times per day Temo Parada MD        glucagon (rDNA) injection 1 mg  1 mg Intramuscular PRN Temo Parada MD        dextrose 5 % solution  100 mL/hr Intravenous PRN Temo Parada MD        promethazine (PHENERGAN) tablet 12.5 mg  12.5 mg Oral Q6H PRN Akhil Byrd MD        Or    ondansetron (ZOFRAN) injection 4 mg  4 mg Intravenous Q6H PRN Temo Parada MD        potassium chloride (KLOR-CON M) extended release tablet 40 mEq  40 mEq Oral PRN Temo Parada MD        Or    potassium bicarb-citric acid (EFFER-K) effervescent tablet 40 mEq  40 mEq Oral PRN Akhil Byrd MD        Or    potassium chloride 10 mEq/100 mL IVPB (Peripheral Line)  10 mEq Intravenous PRN Temo Parada MD        magnesium sulfate 2000 mg in 50 mL IVPB premix  2,000 mg Intravenous PRN Akhil Byrd MD        0.9 % sodium chloride infusion   Intravenous Continuous Akhil Byrd MD 75 mL/hr at 02/10/21 0900 New Bag at 02/10/21 0900    heparin (porcine) injection 5,000 Units  5,000 Units Subcutaneous 3 times per day Temo Parada MD        insulin lispro (HUMALOG) injection vial 0-12 Units  0-12 Units Subcutaneous TID WC Akhil Byrd MD        insulin lispro (HUMALOG) injection vial 0-6 Units  0-6 Units Subcutaneous Nightly Akhil Byrd MD        sodium chloride flush 0.9 % injection 10 mL  10 mL Intravenous 2 times per day Shane Jimenez MD        sodium chloride flush 0.9 % injection 10 mL  10 mL Intravenous PRN Shane Jimenez MD        acetaminophen (TYLENOL) tablet 650 mg  650 mg Oral Q4H PRN Shane Jimenez MD        [START ON 2/11/2021] prasugrel (EFFIENT) tablet 10 mg  10 mg Oral Daily Shane Jimenez MD        [START ON 2/11/2021] aspirin EC tablet 81 mg  81 mg Oral Daily Shane Jimenez MD        tirofiban (AGGRASTAT) IV bolus 1,497.5 mcg  25 mcg/kg Intravenous Once Glory Green MD        tirofiban (AGGRASTAT) 50 mcg/mL infusion  0.075 mcg/kg/min Intravenous Continuous Glory Green MD   Stopped at 02/10/21 1110    chlorhexidine (PERIDEX) 0.12 % solution 15 mL  15 mL Mouth/Throat BID Glory Green MD        aspirin suppository 300 mg  300 mg Rectal Daily Cherie Soto MD        ipratropium-albuterol (DUONEB) nebulizer solution 1 ampule  1 ampule Inhalation 4x Daily PRN Akhil Byrd MD        albuterol sulfate  (90 Base) MCG/ACT inhaler 4 puff  4 puff Inhalation 4x Daily Akhil Byrd MD        ipratropium (ATROVENT HFA) 17 MCG/ACT inhaler 4 puff  4 puff Inhalation 4x daily Cherie Soto MD        DOPamine (INTROPIN) 400 mg in dextrose 5 % 250 mL infusion  2-20 mcg/kg/min Intravenous Continuous Gorge Linda MD 33.7 mL/hr at 02/10/21 1028 15 mcg/kg/min at 02/10/21 1028    sodium polystyrene (KAYEXALATE) 15 GM/60ML suspension 15 g  15 g Oral Once Gorge Linda MD        sodium bicarbonate 8.4 % injection 50 mEq  50 mEq Intravenous Once Gorge Linda MD        lidocaine PF 1 % injection 5 mL  5 mL Intradermal Once Gorge Linda MD        sodium chloride flush 0.9 % injection 10 mL  10 mL Intravenous 2 times per day Gorge Linda MD        sodium chloride flush 0.9 % injection 10 mL  10 mL Intravenous PRN Gorge Linda MD        sodium bicarbonate 150 mEq in sterile water 1,000 mL infusion   Intravenous Continuous Amelia Arriola DO        prismaSATE BGK 4/2.5 dialysis solution   Dialysis Continuous Amelia Arriola DO        prismaSATE BGK 4/2.5 dialysis solution   Dialysis Continuous Amelia Arriola DO        prismaSATE BGK 4/2.5 dialysis solution   Dialysis Continuous Amelia Arriola DO        potassium chloride 20 mEq/50 mL IVPB (Central Line)  20 mEq Intravenous PRN Amelia Arriola DO        magnesium sulfate 1000 mg in review:    Lab Review   Recent Labs     02/10/21  0603   WBC 5.4   HGB 10.0*   HCT 30.0*         Recent Labs     02/10/21  0950   *   K 3.5      CO2 >50*   PHOS 5.1*   BUN 44*   CREATININE 1.9*     Recent Labs     02/10/21  0603   *   *   BILITOT 0.4   ALKPHOS 96     Recent Labs     02/10/21  0603   TROPONINT 0.058*       Recent Labs     02/10/21  0603   PROBNP 1,949*     Lab Results   Component Value Date    INR 1.26 02/10/2021    PROTIME 15.3 (H) 02/10/2021       EKG: (reviewed by myself)    ECHO:(reviewed by myself)    Chest Xray:(reviewed by myself)  Echocardiogram Limited    Result Date: 2/10/2021  Transthoracic Echocardiography Report (TTE)  Demographics   Patient Name       Isadora Olivarez Date of Study       02/10/2021   Date of Birth      1950         Gender              Female   Age                78 year(s)         Race                Other   Patient Number     [id number]         Room Number         2119   Visit Number       [id number]   Corporate ID       [id number]   Accession Number   [id number]         [facility], [address] [facility]   Ordering Physician Carlos Roman MD      Physician           Joes Manuel Poole MD  Procedure Type of Study   TTE procedure:ECHOCARDIOGRAM LIMITED. Procedure Date Date: 02/10/2021 Start: 08:59 AM Study Location: Portable Technical Quality: Adequate visualization Indications:Cardiac arrest. Patient Status: STAT Height: 61.02 inches Weight: 132 pounds BSA: 1.58 m2 BMI: 24.92 kg/m2 HR: 112 bpm BP: 94/77 mmHg  Conclusions   Summary  This is a limited echocardiogram.  Left ventricular systolic function is abnormal.  Ejection fraction is visually estimated at 30%. Inferior wall is akinetic. Mild left ventricular hypertrophy. Very low flow by Doppler. No evidence of any pericardial effusion.   Positive bubble study. PFO suspected. Temporary pacer wire visualized within the IVC and right heart. Signature   ------------------------------------------------------------------  Electronically signed by Hilda Cuba MD  (Interpreting physician) on 02/10/2021 at 10:57 AM  ------------------------------------------------------------------   Findings   Left Ventricle  Left ventricular systolic function is abnormal.  Ejection fraction is visually estimated at 20-30%. Inferior wall is akinetic. Mild left ventricular hypertrophy. Tricuspid Valve  Mild tricuspid regurgitation is present. Pericardial Effusion  No evidence of any pericardial effusion. Miscellaneous  Positive bubble study. PFO suspected. Temporary pacer wire visualized within the IVC and right heart.   M-Mode/2D Measurements & Calculations   LV Diastolic Dimension: 2.22 cm LV Systolic Dimension: 0.51 cm  LV FS:15 %                      LV Volume Diastolic: 49 ml  LV PW Diastolic: 1.4 cm         LV Volume Systolic: 38 ml  Septum Diastolic: 1.03 cm       LV EDV/LV EDV Index: 49 ml/31 m2LV ESV/LV                                  ESV Index: 38 ml/24 m2                                  EF Calculated (A4C): 22.5 %                                  EF Calculated (2D): 31.9 %  LV Area Diastolic: 60.4 cm2  LV Area Systolic: 94.8 cm2      LV Length: 7.06 cm  Doppler Measurements & Calculations                              Estimated RVSP: 11 mmHg                             Estimated RAP:3 mmHg    Estimated PASP: 9.76 mmHg TR Velocity:130 cm/s                             TR Gradient:6.76 mmHg      Ct Head Wo Contrast    Result Date: 2/10/2021  EXAMINATION: CT OF THE HEAD WITHOUT CONTRAST 2/10/2021 6:40 am TECHNIQUE: CT of the head was performed without the administration of intravenous contrast. Dose modulation, iterative reconstruction, and/or weight based adjustment of the mA/kV was utilized to reduce the radiation dose to as low as reasonably achievable. COMPARISON: Head CT 02/27/2017 HISTORY: ORDERING SYSTEM PROVIDED HISTORY: ams TECHNOLOGIST PROVIDED HISTORY: Reason for exam:->ams Has a \"code stroke\" or \"stroke alert\" been called? ->No Decision Support Exception->Emergency Medical Condition (MA) Reason for Exam: ams/cardiac arrest Acuity: Acute Type of Exam: Initial Additional signs and symptoms: found down at home Relevant Medical/Surgical History: pt on vent FINDINGS: BRAIN/VENTRICLES:  No masses nor acute intracranial hemorrhage. Intact gray/white matter differentiation without findings of acute ischemia. No mass effect nor midline shift. Patent basilar cisterns and foramen magnum. No hydrocephalus. Mild diffuse atrophy. Minimal deep and periventricular white matter hypodensities likely due to chronic small vessel ischemia. Physiologic bilateral basal ganglia calcifications. Partially empty sella turcica. ORBITS:  New bilateral lens implants. Slight gaze divergence. Mild bilateral proptosis. SINUSES:  Hypoplastic left frontal sinus. Minimal mucoperiosteal thickening in the bilateral ethmoid and bilateral maxillary sinuses. Partial opacification of the left mastoid air cells likely due to effusion. SOFT TISSUES/SKULL:  No acute soft tissue abnormality. Mild atherosclerotic calcifications. No acute fracture. No acute intracranial abnormality. Xr Chest Portable    Result Date: 2/10/2021  EXAMINATION: ONE XRAY VIEW OF THE CHEST 2/10/2021 6:56 am COMPARISON: Chest radiograph 05/26/2018 HISTORY: ORDERING SYSTEM PROVIDED HISTORY: post intubation TECHNOLOGIST PROVIDED HISTORY: Reason for exam:->post intubation Reason for Exam: post intubation, ng placement Acuity: Acute Type of Exam: Initial FINDINGS: Endotracheal tube terminating approximately 4.4 cm above the cristina at the level of the medial clavicles and great vessel origins.   Partially included gastric tube with the tip and sideport not included but extending at least into the gastric body below the gastroesophageal junction. Overlying heart monitor leads. Persistent slight eventration or elevation of the right hemidiaphragm. Low lung volumes. Left infrahilar airspace opacity, likely retrocardiac in location. New linear opacity in the lateral left lung base likely due to subsegmental atelectasis. Otherwise clear lungs. No definite findings of pneumothorax or pleural effusion. Normal mediastinal, hilar, and cardiac contours. Atherosclerotic calcification in the aorta. No acute fracture. 1. Endotracheal tube in appropriate position as above. Of note, a partially included gastric tube is also considered appropriately positioned despite incomplete inclusion. 2. Left lower lobe airspace opacity potentially due to atelectasis, pneumonia, or aspiration. Adjacent subsegmental atelectasis. All labs, medications and tests reviewed by myself including data  from outside source , patient and available family . Continue all other medications of all above medical condition listed as is. Impression:  Active Problems:    Cardiac arrest ([address]    Complete heart block (HCC)    JAMES (acute kidney injury) ([address]    Hyperkalemia    Metabolic acidosis  Resolved Problems:    * No resolved hospital problems. *      Assessment: 78 y. o.year old with PMH of  has a past medical history of Arthritis, Diabetes mellitus ([address] History of blood transfusion, Hyperlipidemia, and Hypertension. Plan and Recommendations: We will plan cardia cath and place a temporary pacer   Will need hy[pothermia protocol and ICU   DVT prophylaxis if no contraindication            Thank you  much for consult and giving us the opportunity in contributing in the care of this patient. Please feel free to call me for any questions.        Reshma Arguelles MD, 2/10/2021 1:14 PM

## 2021-02-10 NOTE — ED NOTES
Insulin and fentanyl waste paperwork tubed to cath lab by this nurse.       Christal Nguyen RN  02/10/21 3793

## 2021-02-10 NOTE — ED NOTES
Bed: 01TR-01  Expected date:   Expected time:   Means of arrival:   Comments:  EMS HYPOTEN/EFRAIN/HYPERGLYCEMIC       Emily Brown RN  02/10/21 5213

## 2021-02-11 LAB
EKG ATRIAL RATE: 122 BPM
EKG DIAGNOSIS: NORMAL
EKG Q-T INTERVAL: 376 MS
EKG QRS DURATION: 140 MS
EKG QTC CALCULATION (BAZETT): 506 MS
EKG R AXIS: 100 DEGREES
EKG T AXIS: -61 DEGREES
EKG VENTRICULAR RATE: 109 BPM

## 2021-02-11 NOTE — H&P
skin nightly 1 vial 0    insulin lispro (HUMALOG) 100 UNIT/ML injection vial Inject 0-12 Units into the skin 3 times daily (with meals) 1 vial 3    linagliptin (TRADJENTA) 5 MG tablet Take 1 tablet by mouth daily 30 tablet 0    metFORMIN (GLUCOPHAGE) 1000 MG tablet Take 1 tablet by mouth 2 times daily (with meals) 60 tablet 0    metoprolol tartrate (LOPRESSOR) 25 MG tablet Take 1 tablet by mouth 2 times daily 60 tablet 0    docusate sodium (COLACE, DULCOLAX) 100 MG CAPS Take 100 mg by mouth 2 times daily      atorvastatin (LIPITOR) 40 MG tablet Take 40 mg by mouth daily      aspirin 81 MG tablet Take 81 mg by mouth daily         Allergies:  No Known Allergies     Social History:   reports that she has never smoked. She has never used smokeless tobacco. She reports that she does not drink alcohol or use drugs. Family History:  family history is not on file. ,     Immunization History   Administered Date(s) Administered    Pneumococcal Conjugate 13-valent (Alver Phenes) 06/01/2018       Physical Exam:  BP (!) 0/0   Pulse (!) 0   Temp (!) 91.2 °F (32.9 °C) (Bladder)   Resp 16 Comment: Ventilated  Ht 5' 1\" (1.549 m)   Wt 132 lb (59.9 kg)   SpO2 (!) 21%   BMI 24.94 kg/m²     General appearance: intubated   Cardiovascular: Regular rhythm, normal S1, S2.    No edema in lower extremities  Respiratory: Clear to auscultation bilaterally, no wheeze, good inspiratory effort  Gastrointestinal: Abdomen soft, , not distended, normal bowel sounds  Skin: Warm, dry, normal turgor, no rash    Labs:  CBC:   Lab Results   Component Value Date    WBC 5.4 02/10/2021    RBC 3.56 02/10/2021    HGB 10.0 02/10/2021    HCT 30.0 02/10/2021    MCV 84.3 02/10/2021    MCH 28.1 02/10/2021    MCHC 33.3 02/10/2021    RDW 13.9 02/10/2021     02/10/2021    MPV 11.6 02/10/2021     BMP:    Lab Results   Component Value Date     02/10/2021    K 3.5 02/10/2021     02/10/2021    CO2 >50 02/10/2021    BUN 44 02/10/2021 CREATININE 1.9 02/10/2021    CALCIUM 9.6 02/10/2021    GFRAA 32 02/10/2021    LABGLOM 26 02/10/2021    GLUCOSE 445 02/10/2021       Chest Xray:   EKG:    I visualized CXR images and EKG strips      Patient Active Problem List   Diagnosis Code    Hyperglycemia R73.9    Uncontrolled type 2 diabetes mellitus with diabetic polyneuropathy, with long-term current use of insulin ([facility]) E11.42, Z79.4, E11.65    Diabetic neuropathy ([facility]) E11.40    Closed right hip fracture, initial encounter ([facility] S72.001A    Uncontrolled pain R52    Essential hypertension I10    Acute blood loss anemia D62    Gait disturbance R26.9    Cardiac arrest ([facility]) I46.9    Complete heart block (HCC) I44.2    JAMES ([facility]) ([facility] N17.9    Hyperkalemia T25.9    Metabolic acidosis S45.1         Active Hospital Problems    Diagnosis    Cardiac arrest ([facility] [I46.9]    Complete heart block (HCC) [I44.2]    JAMES (acute kidney injury) ([facility] [N17.9]    Hyperkalemia [C20.8]    Metabolic acidosis [I74.9]     DM II uncontrolled with  DKA         Assessment/Plan:   Admitted to [address] [place] [address] after she was coded and had [facility]   Cardiology , nephrology, pulmo, endo consult   Tele   IVF  Home meds , reviewed and resumed as appropriate   Symptoms releif/Pain control  DVT proph           Heraclio Agudelo MD    2/10/2021 10:31 PM

## 2024-10-18 NOTE — ED NOTES
[facility] [phone], Jensen Her [facility]) [phone]- [phone] would like updates.       Talya Delarosa RN  02/10/21 6703 [facility] ([phone])